# Patient Record
Sex: MALE
[De-identification: names, ages, dates, MRNs, and addresses within clinical notes are randomized per-mention and may not be internally consistent; named-entity substitution may affect disease eponyms.]

---

## 2021-12-29 ENCOUNTER — NURSE TRIAGE (OUTPATIENT)
Dept: OTHER | Facility: CLINIC | Age: 51
End: 2021-12-29

## 2021-12-29 NOTE — TELEPHONE ENCOUNTER
Subjective: Caller states started getting sick on past Sunday,3 days ago,continuing to have vomiting. Current Symptoms: vomited 7 times today,chills/sweating nasal congestion,cough,runny nose,thinks has stomach flu    Onset: 3 days ago    Associated Symptoms: Diarrhea. Denies CP,SOB    Pain Severity: Denies    Temperature: Chills/sweats has not checked temp    What has been tried: Took family members anti nausea med, motrin    LMP: NA Pregnant: NA    Recommended disposition: See PCP within 24 hours. UCC if no appointments available. Care advice provided, patient verbalizes understanding; denies any other questions or concerns; instructed to call back for any new or worsening symptoms. This triage is a result of a call to [address] [place] [address]. Please do not respond to the triage nurse through this encounter. Any subsequent communication should be directly with the patient.     Reason for Disposition   [1] MILD or MODERATE vomiting AND [2] present > 48 hours (2 days) (Exception: mild vomiting with associated diarrhea)    Protocols used: [facility] AND [facility] EVA MEDRANO

## 2023-04-04 LAB
ALANINE AMINOTRANSFERASE (SGPT) (U/L) IN SER/PLAS: 14 U/L (ref 10–52)
ALBUMIN (G/DL) IN SER/PLAS: 4 G/DL (ref 3.4–5)
ALKALINE PHOSPHATASE (U/L) IN SER/PLAS: 115 U/L (ref 33–120)
ANION GAP IN SER/PLAS: 11 MMOL/L (ref 10–20)
ASPARTATE AMINOTRANSFERASE (SGOT) (U/L) IN SER/PLAS: 17 U/L (ref 9–39)
BASOPHILS (10*3/UL) IN BLOOD BY AUTOMATED COUNT: 0.06 X10E9/L (ref 0–0.1)
BASOPHILS/100 LEUKOCYTES IN BLOOD BY AUTOMATED COUNT: 1.1 % (ref 0–2)
BILIRUBIN TOTAL (MG/DL) IN SER/PLAS: 0.6 MG/DL (ref 0–1.2)
CALCIUM (MG/DL) IN SER/PLAS: 9.5 MG/DL (ref 8.6–10.6)
CARBON DIOXIDE, TOTAL (MMOL/L) IN SER/PLAS: 30 MMOL/L (ref 21–32)
CD3+CD4+ ABSOLUTE: 0.45 X10E9/L (ref 0.35–2.74)
CD3+CD8+ ABSOLUTE: 0.67 X10E9/L (ref 0.08–1.49)
CD4/CD8 RATIO: 0.67 (ref 1–3.5)
CD45%: 100 %
CHLORIDE (MMOL/L) IN SER/PLAS: 105 MMOL/L (ref 98–107)
CHOLESTEROL (MG/DL) IN SER/PLAS: 123 MG/DL (ref 0–199)
CHOLESTEROL IN HDL (MG/DL) IN SER/PLAS: 44.1 MG/DL
CHOLESTEROL/HDL RATIO: 2.8
CP CD3+CD4+%: 33 % (ref 29–57)
CP CD3+CD8+%: 49 % (ref 7–31)
CREATININE (MG/DL) IN SER/PLAS: 1.51 MG/DL (ref 0.5–1.3)
EOSINOPHILS (10*3/UL) IN BLOOD BY AUTOMATED COUNT: 0.18 X10E9/L (ref 0–0.7)
EOSINOPHILS/100 LEUKOCYTES IN BLOOD BY AUTOMATED COUNT: 3.4 % (ref 0–6)
ERYTHROCYTE DISTRIBUTION WIDTH (RATIO) BY AUTOMATED COUNT: 22.9 % (ref 11.5–14.5)
ERYTHROCYTE MEAN CORPUSCULAR HEMOGLOBIN CONCENTRATION (G/DL) BY AUTOMATED: 30.4 G/DL (ref 32–36)
ERYTHROCYTE MEAN CORPUSCULAR VOLUME (FL) BY AUTOMATED COUNT: 87 FL (ref 80–100)
ERYTHROCYTES (10*6/UL) IN BLOOD BY AUTOMATED COUNT: 4.15 X10E12/L (ref 4.5–5.9)
ESTIMATED AVERAGE GLUCOSE FOR HBA1C: 100 MG/DL
FMETH: ABNORMAL
FSIT1: ABNORMAL
GFR MALE: 54 ML/MIN/1.73M2
GLUCOSE (MG/DL) IN SER/PLAS: 76 MG/DL (ref 74–99)
HEMATOCRIT (%) IN BLOOD BY AUTOMATED COUNT: 36.2 % (ref 41–52)
HEMOGLOBIN (G/DL) IN BLOOD: 11 G/DL (ref 13.5–17.5)
HEMOGLOBIN A1C/HEMOGLOBIN TOTAL IN BLOOD: 5.1 %
IMMATURE GRANULOCYTES/100 LEUKOCYTES IN BLOOD BY AUTOMATED COUNT: 0.2 % (ref 0–0.9)
LDL: 55 MG/DL (ref 0–99)
LEUKOCYTES (10*3/UL) IN BLOOD BY AUTOMATED COUNT: 5.3 X10E9/L (ref 4.4–11.3)
LYMPHOCYTES (10*3/UL) IN BLOOD BY AUTOMATED COUNT: 1.37 X10E9/L (ref 1.2–4.8)
LYMPHOCYTES/100 LEUKOCYTES IN BLOOD BY AUTOMATED COUNT: 25.7 % (ref 13–44)
MONOCYTES (10*3/UL) IN BLOOD BY AUTOMATED COUNT: 0.41 X10E9/L (ref 0.1–1)
MONOCYTES/100 LEUKOCYTES IN BLOOD BY AUTOMATED COUNT: 7.7 % (ref 2–10)
NEUTROPHILS (10*3/UL) IN BLOOD BY AUTOMATED COUNT: 3.31 X10E9/L (ref 1.2–7.7)
NEUTROPHILS/100 LEUKOCYTES IN BLOOD BY AUTOMATED COUNT: 61.9 % (ref 40–80)
NRBC (PER 100 WBCS) BY AUTOMATED COUNT: 0 /100 WBC (ref 0–0)
PLATELETS (10*3/UL) IN BLOOD AUTOMATED COUNT: 305 X10E9/L (ref 150–450)
POTASSIUM (MMOL/L) IN SER/PLAS: 4.2 MMOL/L (ref 3.5–5.3)
PROTEIN TOTAL: 7.1 G/DL (ref 6.4–8.2)
SODIUM (MMOL/L) IN SER/PLAS: 142 MMOL/L (ref 136–145)
TRIGLYCERIDE (MG/DL) IN SER/PLAS: 120 MG/DL (ref 0–149)
UREA NITROGEN (MG/DL) IN SER/PLAS: 15 MG/DL (ref 6–23)
VLDL: 24 MG/DL (ref 0–40)

## 2023-04-05 LAB
CHLAMYDIA TRACH., AMPLIFIED: NEGATIVE
HIV-1 RNA PCR VIRAL LOAD LOG: NORMAL LOG10 CPY/ML
HIV-1 RNA VIRAL LOAD: NOT DETECTED COPIES/ML
N. GONORRHEA, AMPLIFIED: NEGATIVE
RPR MONITORING: NORMAL
VDRL: NONREACTIVE

## 2023-08-08 ENCOUNTER — HOSPITAL ENCOUNTER (OUTPATIENT)
Dept: DATA CONVERSION | Facility: HOSPITAL | Age: 53
Discharge: HOME | End: 2023-08-08

## 2023-08-08 DIAGNOSIS — N17.9 ACUTE KIDNEY FAILURE, UNSPECIFIED (CMS-HCC): ICD-10-CM

## 2023-08-08 LAB
ALBUMIN SERPL-MCNC: 4 GM/DL (ref 3.5–5)
ANION GAP SERPL CALCULATED.3IONS-SCNC: 9 MMOL/L (ref 0–19)
BACTERIA UR QL AUTO: NEGATIVE
BILIRUB UR QL STRIP.AUTO: NEGATIVE
BUN SERPL-MCNC: 13 MG/DL (ref 8–25)
BUN/CREAT SERPL: 8.7 RATIO (ref 8–21)
CALCIUM SERPL-MCNC: 9.3 MG/DL (ref 8.5–10.4)
CHLORIDE SERPL-SCNC: 106 MMOL/L (ref 97–107)
CLARITY UR: CLEAR
CO2 SERPL-SCNC: 28 MMOL/L (ref 24–31)
COLOR UR: NORMAL
CREAT SERPL-MCNC: 1.5 MG/DL (ref 0.4–1.6)
GFR SERPL CREATININE-BSD FRML MDRD: 55 ML/MIN/1.73 M2
GLUCOSE SERPL-MCNC: 101 MG/DL (ref 65–99)
GLUCOSE UR STRIP.AUTO-MCNC: NEGATIVE MG/DL
HGB UR QL STRIP.AUTO: NORMAL /HPF (ref 0–3)
HGB UR QL: NEGATIVE
HYALINE CASTS UR QL AUTO: NORMAL /LPF
KETONES UR QL STRIP.AUTO: NEGATIVE
LEUKOCYTE ESTERASE UR QL STRIP.AUTO: NEGATIVE
MICROSCOPIC (UA): NORMAL
NITRITE UR QL STRIP.AUTO: NEGATIVE
PH UR STRIP.AUTO: 5.5 [PH] (ref 4.6–8)
PHOSPHATE SERPL-MCNC: 3.1 MG/DL (ref 2.5–4.5)
POTASSIUM SERPL-SCNC: 5.1 MMOL/L (ref 3.4–5.1)
PROT UR STRIP.AUTO-MCNC: NEGATIVE MG/DL
SODIUM SERPL-SCNC: 143 MMOL/L (ref 133–145)
SP GR UR STRIP.AUTO: 1.01 (ref 1–1.03)
SQUAMOUS UR QL AUTO: NORMAL /HPF
URINE CULTURE: NORMAL
UROBILINOGEN UR QL STRIP.AUTO: NORMAL MG/DL (ref 0–1)
WBC #/AREA URNS AUTO: 1 /HPF (ref 0–3)

## 2023-08-09 ENCOUNTER — HOSPITAL ENCOUNTER (OUTPATIENT)
Dept: DATA CONVERSION | Facility: HOSPITAL | Age: 53
Discharge: HOME | End: 2023-08-09

## 2023-08-09 DIAGNOSIS — N17.9 ACUTE KIDNEY FAILURE, UNSPECIFIED (CMS-HCC): ICD-10-CM

## 2023-08-22 PROBLEM — J96.00 ACUTE RESPIRATORY FAILURE (MULTI): Status: ACTIVE | Noted: 2023-08-22

## 2023-08-22 PROBLEM — E87.6 HYPOKALEMIA: Status: ACTIVE | Noted: 2023-08-22

## 2023-08-22 PROBLEM — K80.20 CHOLELITHIASIS: Status: ACTIVE | Noted: 2023-08-22

## 2023-08-22 PROBLEM — E78.5 HYPERLIPIDEMIA: Status: ACTIVE | Noted: 2023-08-22

## 2023-08-22 PROBLEM — J11.2 INFLUENZA WITH GASTROINTESTINAL TRACT INVOLVEMENT: Status: ACTIVE | Noted: 2023-08-22

## 2023-08-22 PROBLEM — Z79.899 HIGH RISK MEDICATION USE: Status: ACTIVE | Noted: 2023-08-22

## 2023-08-22 PROBLEM — R68.89 ABNORMAL FINDING: Status: ACTIVE | Noted: 2023-08-22

## 2023-08-22 PROBLEM — A41.9 SEPSIS (MULTI): Status: ACTIVE | Noted: 2023-08-22

## 2023-08-22 PROBLEM — E86.0 DEHYDRATION: Status: ACTIVE | Noted: 2023-08-22

## 2023-08-22 PROBLEM — R53.1 ASTHENIA: Status: ACTIVE | Noted: 2023-08-22

## 2023-08-22 PROBLEM — Z21 HUMAN IMMUNODEFICIENCY VIRUS INFECTION: Status: ACTIVE | Noted: 2023-08-22

## 2023-08-22 PROBLEM — Z59.41 FOOD INSECURITY: Status: ACTIVE | Noted: 2023-08-22

## 2023-08-22 PROBLEM — Z91.89 GLAUCOMA HIGH RISK: Status: ACTIVE | Noted: 2023-08-22

## 2023-08-22 PROBLEM — E87.20 ACIDOSIS: Status: ACTIVE | Noted: 2023-08-22

## 2023-08-22 PROBLEM — B20 HUMAN IMMUNODEFICIENCY VIRUS INFECTION (MULTI): Status: ACTIVE | Noted: 2023-08-22

## 2023-08-22 PROBLEM — I10 HYPERTENSION: Status: ACTIVE | Noted: 2023-08-22

## 2023-08-22 PROBLEM — N19 RENAL FAILURE: Status: ACTIVE | Noted: 2023-08-22

## 2023-08-22 PROBLEM — F41.8 DEPRESSION WITH ANXIETY: Status: ACTIVE | Noted: 2023-08-22

## 2023-08-22 PROBLEM — R18.8 ABDOMINAL FLUID COLLECTION: Status: ACTIVE | Noted: 2023-08-22

## 2023-08-22 PROBLEM — E11.9 DIABETES MELLITUS WITHOUT COMPLICATION (MULTI): Status: ACTIVE | Noted: 2023-08-22

## 2023-08-22 PROBLEM — E43 SEVERE PROTEIN-CALORIE MALNUTRITION (GOMEZ: LESS THAN 60% OF STANDARD WEIGHT) (MULTI): Status: ACTIVE | Noted: 2023-08-22

## 2023-08-22 PROBLEM — R11.2 NAUSEA & VOMITING: Status: ACTIVE | Noted: 2023-08-22

## 2023-08-22 PROBLEM — Z93.2 ILEOSTOMY STATUS (MULTI): Status: ACTIVE | Noted: 2023-08-22

## 2023-08-22 PROBLEM — D64.9 HEMOGLOBIN LOW: Status: ACTIVE | Noted: 2023-08-22

## 2023-08-22 PROBLEM — E86.1 HYPOVOLEMIA: Status: ACTIVE | Noted: 2023-08-22

## 2023-08-22 PROBLEM — F41.9 ANXIETY: Status: ACTIVE | Noted: 2023-08-22

## 2023-08-22 PROBLEM — K08.89 ATYPICAL TOOTHACHE: Status: ACTIVE | Noted: 2023-08-22

## 2023-08-22 PROBLEM — Z93.2 ILEOSTOMY IN PLACE (MULTI): Status: ACTIVE | Noted: 2023-08-22

## 2023-08-22 PROBLEM — E87.5 HYPERKALEMIA: Status: ACTIVE | Noted: 2023-08-22

## 2023-08-22 PROBLEM — I51.89 IMPAIRED CARDIAC FUNCTION: Status: ACTIVE | Noted: 2023-08-22

## 2023-08-22 PROBLEM — E43 SEVERE MALNUTRITION (MULTI): Status: ACTIVE | Noted: 2023-08-22

## 2023-08-22 PROBLEM — B20 AIDS (MULTI): Status: ACTIVE | Noted: 2023-08-22

## 2023-08-22 PROBLEM — E87.1 HYPONATREMIA: Status: ACTIVE | Noted: 2023-08-22

## 2023-08-22 PROBLEM — R79.89 ABNORMAL LIVER FUNCTION TESTS: Status: ACTIVE | Noted: 2023-08-22

## 2023-08-22 PROBLEM — G47.30 SLEEP APNEA: Status: ACTIVE | Noted: 2023-08-22

## 2023-08-22 PROBLEM — K65.9 PERITONITIS (MULTI): Status: ACTIVE | Noted: 2023-08-22

## 2023-08-22 PROBLEM — F32.9 REACTIVE DEPRESSION: Status: ACTIVE | Noted: 2023-08-22

## 2023-08-22 RX ORDER — LISINOPRIL 10 MG/1
1 TABLET ORAL DAILY
COMMUNITY
Start: 2021-02-10 | End: 2024-03-22 | Stop reason: ENTERED-IN-ERROR

## 2023-08-22 RX ORDER — LATANOPROST 50 UG/ML
1 SOLUTION/ DROPS OPHTHALMIC NIGHTLY
COMMUNITY

## 2023-08-22 RX ORDER — CHOLESTYRAMINE 4 G/9G
1 POWDER, FOR SUSPENSION ORAL DAILY
COMMUNITY
End: 2024-03-22 | Stop reason: ENTERED-IN-ERROR

## 2023-08-22 RX ORDER — IBUPROFEN 800 MG/1
800 TABLET ORAL EVERY 8 HOURS PRN
COMMUNITY
End: 2024-03-22 | Stop reason: ENTERED-IN-ERROR

## 2023-08-22 RX ORDER — ESCITALOPRAM OXALATE 10 MG/1
1 TABLET ORAL DAILY
COMMUNITY
Start: 2021-10-05 | End: 2024-03-22 | Stop reason: ENTERED-IN-ERROR

## 2023-08-22 RX ORDER — PANTOPRAZOLE SODIUM 40 MG/1
40 TABLET, DELAYED RELEASE ORAL DAILY
COMMUNITY
Start: 2022-07-13 | End: 2024-03-22 | Stop reason: ENTERED-IN-ERROR

## 2023-08-22 RX ORDER — DIPHENOXYLATE HYDROCHLORIDE AND ATROPINE SULFATE 2.5; .025 MG/1; MG/1
1 TABLET ORAL 4 TIMES DAILY PRN
COMMUNITY
End: 2024-03-22 | Stop reason: ENTERED-IN-ERROR

## 2023-08-22 RX ORDER — CODEINE SULFATE 15 MG/1
15 TABLET ORAL 4 TIMES DAILY PRN
COMMUNITY
End: 2024-03-22 | Stop reason: ENTERED-IN-ERROR

## 2023-08-22 RX ORDER — MULTIVITAMIN
1 TABLET ORAL DAILY
COMMUNITY

## 2023-08-22 RX ORDER — ELVITEGRAVIR, COBICISTAT, EMTRICITABINE, AND TENOFOVIR ALAFENAMIDE 150; 150; 200; 10 MG/1; MG/1; MG/1; MG/1
TABLET ORAL
COMMUNITY
Start: 2017-05-30 | End: 2024-04-05

## 2023-08-22 RX ORDER — DOCUSATE SODIUM 100 MG/1
100 CAPSULE, LIQUID FILLED ORAL DAILY PRN
COMMUNITY
End: 2024-03-22 | Stop reason: ENTERED-IN-ERROR

## 2023-08-22 RX ORDER — LOPERAMIDE HYDROCHLORIDE 2 MG/1
2 CAPSULE ORAL 4 TIMES DAILY PRN
COMMUNITY

## 2023-10-03 ENCOUNTER — OFFICE VISIT (OUTPATIENT)
Dept: IMMUNOLOGY | Facility: CLINIC | Age: 53
End: 2023-10-03
Payer: MEDICAID

## 2023-10-03 VITALS
HEART RATE: 68 BPM | SYSTOLIC BLOOD PRESSURE: 155 MMHG | OXYGEN SATURATION: 99 % | HEIGHT: 72 IN | RESPIRATION RATE: 16 BRPM | DIASTOLIC BLOOD PRESSURE: 87 MMHG | TEMPERATURE: 97.8 F | WEIGHT: 162.8 LBS | BODY MASS INDEX: 22.05 KG/M2

## 2023-10-03 DIAGNOSIS — Z23 FLU VACCINE NEED: ICD-10-CM

## 2023-10-03 DIAGNOSIS — Z21 ASYMPTOMATIC HIV INFECTION, WITH NO HISTORY OF HIV-RELATED ILLNESS (MULTI): Primary | ICD-10-CM

## 2023-10-03 DIAGNOSIS — E11.9 DIABETES MELLITUS WITHOUT COMPLICATION (MULTI): ICD-10-CM

## 2023-10-03 DIAGNOSIS — B20 AIDS (MULTI): ICD-10-CM

## 2023-10-03 LAB
ALBUMIN SERPL BCP-MCNC: 4.1 G/DL (ref 3.4–5)
ALP SERPL-CCNC: 102 U/L (ref 33–120)
ALT SERPL W P-5'-P-CCNC: 15 U/L (ref 10–52)
ANION GAP SERPL CALC-SCNC: 13 MMOL/L (ref 10–20)
AST SERPL W P-5'-P-CCNC: 20 U/L (ref 9–39)
BASOPHILS # BLD AUTO: 0.06 X10*3/UL (ref 0–0.1)
BASOPHILS NFR BLD AUTO: 0.9 %
BILIRUB SERPL-MCNC: 0.5 MG/DL (ref 0–1.2)
BUN SERPL-MCNC: 18 MG/DL (ref 6–23)
CALCIUM SERPL-MCNC: 9.7 MG/DL (ref 8.6–10.6)
CHLORIDE SERPL-SCNC: 106 MMOL/L (ref 98–107)
CO2 SERPL-SCNC: 29 MMOL/L (ref 21–32)
CREAT SERPL-MCNC: 1.28 MG/DL (ref 0.5–1.3)
EOSINOPHIL # BLD AUTO: 0.37 X10*3/UL (ref 0–0.7)
EOSINOPHIL NFR BLD AUTO: 5.5 %
ERYTHROCYTE [DISTWIDTH] IN BLOOD BY AUTOMATED COUNT: 16.6 % (ref 11.5–14.5)
GFR SERPL CREATININE-BSD FRML MDRD: 66 ML/MIN/1.73M*2
GLUCOSE SERPL-MCNC: 61 MG/DL (ref 74–99)
HCT VFR BLD AUTO: 43.9 % (ref 41–52)
HGB BLD-MCNC: 13.7 G/DL (ref 13.5–17.5)
IMM GRANULOCYTES # BLD AUTO: 0.01 X10*3/UL (ref 0–0.7)
IMM GRANULOCYTES NFR BLD AUTO: 0.1 % (ref 0–0.9)
LYMPHOCYTES # BLD AUTO: 2.14 X10*3/UL (ref 1.2–4.8)
LYMPHOCYTES NFR BLD AUTO: 32 %
MCH RBC QN AUTO: 31.4 PG (ref 26–34)
MCHC RBC AUTO-ENTMCNC: 31.2 G/DL (ref 32–36)
MCV RBC AUTO: 101 FL (ref 80–100)
MONOCYTES # BLD AUTO: 0.49 X10*3/UL (ref 0.1–1)
MONOCYTES NFR BLD AUTO: 7.3 %
NEUTROPHILS # BLD AUTO: 3.61 X10*3/UL (ref 1.2–7.7)
NEUTROPHILS NFR BLD AUTO: 54.2 %
NRBC BLD-RTO: 0 /100 WBCS (ref 0–0)
PLATELET # BLD AUTO: 236 X10*3/UL (ref 150–450)
PMV BLD AUTO: 11.7 FL (ref 7.5–11.5)
POTASSIUM SERPL-SCNC: 4.7 MMOL/L (ref 3.5–5.3)
PROT SERPL-MCNC: 7.2 G/DL (ref 6.4–8.2)
RBC # BLD AUTO: 4.36 X10*6/UL (ref 4.5–5.9)
SODIUM SERPL-SCNC: 143 MMOL/L (ref 136–145)
WBC # BLD AUTO: 6.7 X10*3/UL (ref 4.4–11.3)

## 2023-10-03 PROCEDURE — 90686 IIV4 VACC NO PRSV 0.5 ML IM: CPT | Performed by: NURSE PRACTITIONER

## 2023-10-03 PROCEDURE — 3079F DIAST BP 80-89 MM HG: CPT | Performed by: NURSE PRACTITIONER

## 2023-10-03 PROCEDURE — 4010F ACE/ARB THERAPY RXD/TAKEN: CPT | Performed by: NURSE PRACTITIONER

## 2023-10-03 PROCEDURE — 88184 FLOWCYTOMETRY/ TC 1 MARKER: CPT | Mod: TC | Performed by: NURSE PRACTITIONER

## 2023-10-03 PROCEDURE — 4274F FLU IMMUNO ADMIND RCVD: CPT | Performed by: NURSE PRACTITIONER

## 2023-10-03 PROCEDURE — 3044F HG A1C LEVEL LT 7.0%: CPT | Performed by: NURSE PRACTITIONER

## 2023-10-03 PROCEDURE — 99213 OFFICE O/P EST LOW 20 MIN: CPT | Mod: 25 | Performed by: NURSE PRACTITIONER

## 2023-10-03 PROCEDURE — 85025 COMPLETE CBC W/AUTO DIFF WBC: CPT | Performed by: NURSE PRACTITIONER

## 2023-10-03 PROCEDURE — 87536 HIV-1 QUANT&REVRSE TRNSCRPJ: CPT | Performed by: NURSE PRACTITIONER

## 2023-10-03 PROCEDURE — 1036F TOBACCO NON-USER: CPT | Performed by: NURSE PRACTITIONER

## 2023-10-03 PROCEDURE — 3077F SYST BP >= 140 MM HG: CPT | Performed by: NURSE PRACTITIONER

## 2023-10-03 PROCEDURE — 36415 COLL VENOUS BLD VENIPUNCTURE: CPT | Performed by: NURSE PRACTITIONER

## 2023-10-03 PROCEDURE — 99213 OFFICE O/P EST LOW 20 MIN: CPT | Performed by: NURSE PRACTITIONER

## 2023-10-03 PROCEDURE — 82435 ASSAY OF BLOOD CHLORIDE: CPT | Performed by: NURSE PRACTITIONER

## 2023-10-03 RX ORDER — TIMOLOL MALEATE 5 MG/ML
1 SOLUTION/ DROPS OPHTHALMIC DAILY
COMMUNITY

## 2023-10-03 ASSESSMENT — ENCOUNTER SYMPTOMS
RESPIRATORY NEGATIVE: 1
CONSTITUTIONAL NEGATIVE: 1
ARTHRALGIAS: 1
HEMATOLOGIC/LYMPHATIC NEGATIVE: 1
ENDOCRINE NEGATIVE: 1
CARDIOVASCULAR NEGATIVE: 1
HEADACHES: 1

## 2023-10-03 ASSESSMENT — PAIN SCALES - GENERAL: PAINLEVEL: 0-NO PAIN

## 2023-10-03 NOTE — PROGRESS NOTES
HIV Clinic Follow-up Visit:    Jet Pearl was last seen in [facility] on Visit date not found.   He is starting full time work at the [employer] as of today.   He remains active at work; and feels pretty good about the full time work.    Missed antiretroviral doses in last 72 hours? No  Sexually active? yes, Partner/s aware of diagnosis? yes,   Condom use?  Monogamous relationship , Partner on PrEP? No  partner is HIV+      Review of Systems  Review of Systems   Constitutional: Negative.    HENT: Negative.     Respiratory: Negative.     Cardiovascular: Negative.    Endocrine: Negative.    Genitourinary: Negative.    Musculoskeletal:  Positive for arthralgias.   Neurological:  Positive for headaches.   Hematological: Negative.    Eyes:   He has been seen by ophth this year for his glaucoma.  He has been placed on a second eye drop.    GI:   His bowel movements are mostly soft - not diarrhea.   He is now able to eat pretty much whatever he wants.    He has no unusual aches or pains.   He has no unusual anxiety or depression.     CURRENT MEDICATIONS:    Current Outpatient Medications:     elviteg-cob-emtri-tenof ALAFEN (Genvoya) [phone] mg tablet, Take by mouth. As directed., Disp: , Rfl:     latanoprost (Xalatan) 0.005 % ophthalmic solution, Administer 1 drop into both eyes once daily at bedtime., Disp: , Rfl:     loperamide (Imodium) 2 mg capsule, Take 1 capsule (2 mg) by mouth 4 times a day as needed., Disp: , Rfl:     multivitamin tablet, Take 1 tablet by mouth once daily., Disp: , Rfl:     timolol (Timoptic) 0.5 % ophthalmic solution, Administer 1 drop into both eyes once daily., Disp: , Rfl:     cholestyramine (Questran) 4 gram packet, Take 1 packet (4 g) by mouth once daily. for 30 day(s), Disp: , Rfl:     codeine 15 mg tablet, Take 1 tablet (15 mg) by mouth 4 times a day as needed., Disp: , Rfl:     diphenoxylate-atropine (Lomotil) 2.5-0.025 mg tablet, Take 1 tablet by mouth 4 times a day as needed., Disp: ,  Rfl:     docusate sodium (Colace) 100 mg capsule, Take 1 capsule (100 mg) by mouth once daily as needed. for 30 day(s), Disp: , Rfl:     escitalopram (Lexapro) 10 mg tablet, Take 1 tablet (10 mg) by mouth once daily., Disp: , Rfl:     ibuprofen 800 mg tablet, Take 1 tablet (800 mg) by mouth every 8 hours if needed. with food or milk, Disp: , Rfl:     lisinopril 10 mg tablet, Take 1 tablet (10 mg) by mouth once daily., Disp: , Rfl:     pantoprazole (ProtoNix) 40 mg EC tablet, Take 1 tablet (40 mg) by mouth once daily. for 90 day(s), Disp: , Rfl:     PHYSICAL EXAMINATION:  Visit Vitals  /87 (BP Location: Right arm, Patient Position: Sitting, BP Cuff Size: Adult)   Pulse 68   Temp 36.6 °C (97.8 °F) (Temporal)   Resp 16   Ht 1.829 m (6')   Wt 73.8 kg (162 lb 12.8 oz)   SpO2 99%   BMI 22.08 kg/m²   Smoking Status Never   BSA 1.94 m²       Physical Exam   Physical Exam  Constitutional:       Appearance: Normal appearance.   HENT:      Head: Normocephalic.   Cardiovascular:      Rate and Rhythm: Normal rate and regular rhythm.      Pulses: Normal pulses.   Pulmonary:      Effort: Pulmonary effort is normal.      Breath sounds: Normal breath sounds.   Abdominal:      General: Bowel sounds are normal.      Comments: Scar from previous surgery is evident on R side of abdomen at about the level of the umbilicus.   Musculoskeletal:         General: Normal range of motion.      Cervical back: Neck supple.   Skin:     General: Skin is warm and dry.   Neurological:      Mental Status: He is alert and oriented to person, place, and time.   Psychiatric:         Mood and Affect: Mood normal.      Comments: Denies any unusual anxiety or depression.    He has been in therapy.  He and his partner also tried some couples counseling.          PERTINENT DATA:  CrCl cannot be calculated (Patient's most recent lab result is older than the maximum 7 days allowed.).  The ASCVD Risk score (Loan PEREIRA, et al., 2019) failed to calculate for  the following reasons:    The valid total cholesterol range is 130 to 320 mg/dL    ASSESSMENT / PLAN:  Problem List Items Addressed This Visit       AIDS (CMS/HCC)    Diabetes mellitus without complication (CMS/HCC)    Human immunodeficiency virus infection (CMS/HCC) - Primary    Relevant Orders    CBC and Auto Differential    CD4/8 Panel    Comprehensive Metabolic Panel    HIV RNA, quantitative, PCR     Other Visit Diagnoses       Flu vaccine need        Relevant Orders    Flu vaccine (IIV4) age 6 months and greater, preservative free (Completed)         Continue current regimen.   See again in 6 months.     Alma Dinero, APRN-CNP

## 2023-10-03 NOTE — LETTER
10/03/23    Randy Pedersen MD  [address] [facility] [address]      Dear Dr. Randy Pedersen MD,    I am writing to confirm that your patient, Jet Pearl, received care in my office on 10/03/23. I have enclosed a summary of the care provided to Jet for your reference.    Please contact me with any questions you may have regarding the visit.    Sincerely,         Alma Dinero, APRN-CNP  [address] [address]    CC: No Recipients

## 2023-10-04 LAB
CD3+CD4+ CELLS # BLD: 0.6 X10E9/L
CD3+CD4+ CELLS NFR BLD: 28 %
CD3+CD4+ CELLS/CD3+CD8+ CLL BLD: 0.54 %
CD3+CD8+ CELLS # BLD: 1.11 X10E9/L
CD3+CD8+ CELLS NFR BLD: 52 %
LYMPHOCYTES # SPEC AUTO: 2.14 X10*3/UL

## 2023-10-20 LAB
HIV1 RNA # PLAS NAA DL=20: NOT DETECTED {COPIES}/ML
HIV1 RNA SPEC NAA+PROBE-LOG#: NORMAL {LOG_COPIES}/ML

## 2024-02-12 ENCOUNTER — LAB (OUTPATIENT)
Dept: LAB | Facility: LAB | Age: 54
End: 2024-02-12
Payer: MEDICAID

## 2024-02-12 DIAGNOSIS — N18.30 CHRONIC KIDNEY DISEASE, STAGE 3 UNSPECIFIED (MULTI): Primary | ICD-10-CM

## 2024-02-12 LAB
ALBUMIN SERPL BCP-MCNC: 3.8 G/DL (ref 3.4–5)
ANION GAP SERPL CALC-SCNC: 9 MMOL/L (ref 10–20)
BUN SERPL-MCNC: 13 MG/DL (ref 6–23)
CALCIUM SERPL-MCNC: 8.9 MG/DL (ref 8.6–10.3)
CHLORIDE SERPL-SCNC: 107 MMOL/L (ref 98–107)
CO2 SERPL-SCNC: 30 MMOL/L (ref 21–32)
CREAT SERPL-MCNC: 1.4 MG/DL (ref 0.5–1.3)
EGFRCR SERPLBLD CKD-EPI 2021: 59 ML/MIN/1.73M*2
ERYTHROCYTE [DISTWIDTH] IN BLOOD BY AUTOMATED COUNT: 15.1 % (ref 11.5–14.5)
GLUCOSE SERPL-MCNC: 88 MG/DL (ref 74–99)
HCT VFR BLD AUTO: 41.7 % (ref 41–52)
HGB BLD-MCNC: 13.1 G/DL (ref 13.5–17.5)
MCH RBC QN AUTO: 32.9 PG (ref 26–34)
MCHC RBC AUTO-ENTMCNC: 31.4 G/DL (ref 32–36)
MCV RBC AUTO: 105 FL (ref 80–100)
NRBC BLD-RTO: 0 /100 WBCS (ref 0–0)
PHOSPHATE SERPL-MCNC: 2.6 MG/DL (ref 2.5–4.9)
PLATELET # BLD AUTO: 238 X10*3/UL (ref 150–450)
POTASSIUM SERPL-SCNC: 3.8 MMOL/L (ref 3.5–5.3)
RBC # BLD AUTO: 3.98 X10*6/UL (ref 4.5–5.9)
SODIUM SERPL-SCNC: 142 MMOL/L (ref 136–145)
WBC # BLD AUTO: 6.4 X10*3/UL (ref 4.4–11.3)

## 2024-02-12 PROCEDURE — 83970 ASSAY OF PARATHORMONE: CPT

## 2024-02-12 PROCEDURE — 36415 COLL VENOUS BLD VENIPUNCTURE: CPT

## 2024-02-12 PROCEDURE — 85027 COMPLETE CBC AUTOMATED: CPT

## 2024-02-12 PROCEDURE — 80069 RENAL FUNCTION PANEL: CPT

## 2024-02-13 LAB — PTH-INTACT SERPL-MCNC: 30.2 PG/ML (ref 18.5–88)

## 2024-03-22 ENCOUNTER — HOSPITAL ENCOUNTER (INPATIENT)
Facility: HOSPITAL | Age: 54
LOS: 2 days | Discharge: HOME | End: 2024-03-24
Attending: EMERGENCY MEDICINE | Admitting: INTERNAL MEDICINE
Payer: MEDICAID

## 2024-03-22 ENCOUNTER — APPOINTMENT (OUTPATIENT)
Dept: RADIOLOGY | Facility: HOSPITAL | Age: 54
End: 2024-03-22
Payer: MEDICAID

## 2024-03-22 DIAGNOSIS — N17.9 ACUTE KIDNEY INJURY (CMS-HCC): ICD-10-CM

## 2024-03-22 DIAGNOSIS — N30.00 ACUTE CYSTITIS WITHOUT HEMATURIA: ICD-10-CM

## 2024-03-22 DIAGNOSIS — N20.1 LEFT URETERAL STONE: ICD-10-CM

## 2024-03-22 DIAGNOSIS — N20.0 LEFT NEPHROLITHIASIS: Primary | ICD-10-CM

## 2024-03-22 DIAGNOSIS — R11.2 NAUSEA AND VOMITING, UNSPECIFIED VOMITING TYPE: ICD-10-CM

## 2024-03-22 LAB
ALBUMIN SERPL-MCNC: 4.4 G/DL (ref 3.5–5)
ALP BLD-CCNC: 128 U/L (ref 35–125)
ALT SERPL-CCNC: 17 U/L (ref 5–40)
ANION GAP SERPL CALC-SCNC: 16 MMOL/L
APPEARANCE UR: ABNORMAL
AST SERPL-CCNC: 23 U/L (ref 5–40)
BASOPHILS # BLD AUTO: 0.02 X10*3/UL (ref 0–0.1)
BASOPHILS NFR BLD AUTO: 0.2 %
BILIRUB SERPL-MCNC: 0.8 MG/DL (ref 0.1–1.2)
BILIRUB UR STRIP.AUTO-MCNC: NEGATIVE MG/DL
BUN SERPL-MCNC: 18 MG/DL (ref 8–25)
CALCIUM SERPL-MCNC: 9.5 MG/DL (ref 8.5–10.4)
CHLORIDE SERPL-SCNC: 103 MMOL/L (ref 97–107)
CO2 SERPL-SCNC: 22 MMOL/L (ref 24–31)
COLOR UR: YELLOW
CREAT SERPL-MCNC: 2 MG/DL (ref 0.4–1.6)
CREAT UR-MCNC: 172.9 MG/DL
EGFRCR SERPLBLD CKD-EPI 2021: 39 ML/MIN/1.73M*2
EOSINOPHIL # BLD AUTO: 0 X10*3/UL (ref 0–0.7)
EOSINOPHIL NFR BLD AUTO: 0 %
ERYTHROCYTE [DISTWIDTH] IN BLOOD BY AUTOMATED COUNT: 14.7 % (ref 11.5–14.5)
FLUAV RNA RESP QL NAA+PROBE: NOT DETECTED
FLUBV RNA RESP QL NAA+PROBE: NOT DETECTED
GLUCOSE SERPL-MCNC: 140 MG/DL (ref 65–99)
GLUCOSE UR STRIP.AUTO-MCNC: ABNORMAL MG/DL
HCT VFR BLD AUTO: 43.1 % (ref 41–52)
HGB BLD-MCNC: 14.4 G/DL (ref 13.5–17.5)
IMM GRANULOCYTES # BLD AUTO: 0.05 X10*3/UL (ref 0–0.7)
IMM GRANULOCYTES NFR BLD AUTO: 0.4 % (ref 0–0.9)
KETONES UR STRIP.AUTO-MCNC: ABNORMAL MG/DL
LEUKOCYTE ESTERASE UR QL STRIP.AUTO: NEGATIVE
LIPASE SERPL-CCNC: 18 U/L (ref 16–63)
LYMPHOCYTES # BLD AUTO: 1.85 X10*3/UL (ref 1.2–4.8)
LYMPHOCYTES NFR BLD AUTO: 14.2 %
MCH RBC QN AUTO: 32.8 PG (ref 26–34)
MCHC RBC AUTO-ENTMCNC: 33.4 G/DL (ref 32–36)
MCV RBC AUTO: 98 FL (ref 80–100)
MONOCYTES # BLD AUTO: 0.68 X10*3/UL (ref 0.1–1)
MONOCYTES NFR BLD AUTO: 5.2 %
MUCOUS THREADS #/AREA URNS AUTO: ABNORMAL /LPF
NEUTROPHILS # BLD AUTO: 10.45 X10*3/UL (ref 1.2–7.7)
NEUTROPHILS NFR BLD AUTO: 80 %
NITRITE UR QL STRIP.AUTO: NEGATIVE
NRBC BLD-RTO: 0 /100 WBCS (ref 0–0)
PH UR STRIP.AUTO: 6 [PH]
PLATELET # BLD AUTO: 238 X10*3/UL (ref 150–450)
POTASSIUM SERPL-SCNC: 3.5 MMOL/L (ref 3.4–5.1)
PROT SERPL-MCNC: 8.3 G/DL (ref 5.9–7.9)
PROT UR STRIP.AUTO-MCNC: ABNORMAL MG/DL
RBC # BLD AUTO: 4.39 X10*6/UL (ref 4.5–5.9)
RBC # UR STRIP.AUTO: ABNORMAL /UL
RBC #/AREA URNS AUTO: >20 /HPF
RBC MORPH BLD: NORMAL
SARS-COV-2 RNA RESP QL NAA+PROBE: NOT DETECTED
SODIUM SERPL-SCNC: 141 MMOL/L (ref 133–145)
SODIUM UR-SCNC: 158 MMOL/L
SODIUM/CREAT UR-RTO: 91 MMOL/G CREAT
SP GR UR STRIP.AUTO: 1.02
UROBILINOGEN UR STRIP.AUTO-MCNC: NORMAL MG/DL
WBC # BLD AUTO: 13.1 X10*3/UL (ref 4.4–11.3)
WBC #/AREA URNS AUTO: ABNORMAL /HPF

## 2024-03-22 PROCEDURE — 74176 CT ABD & PELVIS W/O CONTRAST: CPT

## 2024-03-22 PROCEDURE — 36415 COLL VENOUS BLD VENIPUNCTURE: CPT | Performed by: INTERNAL MEDICINE

## 2024-03-22 PROCEDURE — 96374 THER/PROPH/DIAG INJ IV PUSH: CPT

## 2024-03-22 PROCEDURE — 96361 HYDRATE IV INFUSION ADD-ON: CPT

## 2024-03-22 PROCEDURE — 1210000001 HC SEMI-PRIVATE ROOM DAILY

## 2024-03-22 PROCEDURE — 99285 EMERGENCY DEPT VISIT HI MDM: CPT | Mod: 25

## 2024-03-22 PROCEDURE — 2500000002 HC RX 250 W HCPCS SELF ADMINISTERED DRUGS (ALT 637 FOR MEDICARE OP, ALT 636 FOR OP/ED): Performed by: UROLOGY

## 2024-03-22 PROCEDURE — 2500000004 HC RX 250 GENERAL PHARMACY W/ HCPCS (ALT 636 FOR OP/ED): Performed by: INTERNAL MEDICINE

## 2024-03-22 PROCEDURE — 82570 ASSAY OF URINE CREATININE: CPT | Performed by: EMERGENCY MEDICINE

## 2024-03-22 PROCEDURE — 85025 COMPLETE CBC W/AUTO DIFF WBC: CPT

## 2024-03-22 PROCEDURE — 83690 ASSAY OF LIPASE: CPT

## 2024-03-22 PROCEDURE — 36415 COLL VENOUS BLD VENIPUNCTURE: CPT

## 2024-03-22 PROCEDURE — 2500000004 HC RX 250 GENERAL PHARMACY W/ HCPCS (ALT 636 FOR OP/ED)

## 2024-03-22 PROCEDURE — 80053 COMPREHEN METABOLIC PANEL: CPT

## 2024-03-22 PROCEDURE — 81001 URINALYSIS AUTO W/SCOPE: CPT

## 2024-03-22 PROCEDURE — 87636 SARSCOV2 & INF A&B AMP PRB: CPT

## 2024-03-22 PROCEDURE — 87086 URINE CULTURE/COLONY COUNT: CPT | Mod: WESLAB

## 2024-03-22 PROCEDURE — 74176 CT ABD & PELVIS W/O CONTRAST: CPT | Performed by: RADIOLOGY

## 2024-03-22 PROCEDURE — 96375 TX/PRO/DX INJ NEW DRUG ADDON: CPT

## 2024-03-22 PROCEDURE — 87040 BLOOD CULTURE FOR BACTERIA: CPT | Mod: WESLAB | Performed by: INTERNAL MEDICINE

## 2024-03-22 RX ORDER — ACETAMINOPHEN 160 MG/5ML
650 SOLUTION ORAL EVERY 4 HOURS PRN
Status: DISCONTINUED | OUTPATIENT
Start: 2024-03-22 | End: 2024-03-24 | Stop reason: HOSPADM

## 2024-03-22 RX ORDER — MORPHINE SULFATE 4 MG/ML
4 INJECTION, SOLUTION INTRAMUSCULAR; INTRAVENOUS ONCE
Status: COMPLETED | OUTPATIENT
Start: 2024-03-22 | End: 2024-03-22

## 2024-03-22 RX ORDER — LATANOPROST 50 UG/ML
1 SOLUTION/ DROPS OPHTHALMIC NIGHTLY
Status: DISCONTINUED | OUTPATIENT
Start: 2024-03-22 | End: 2024-03-24 | Stop reason: HOSPADM

## 2024-03-22 RX ORDER — ONDANSETRON HYDROCHLORIDE 2 MG/ML
4 INJECTION, SOLUTION INTRAVENOUS ONCE
Status: COMPLETED | OUTPATIENT
Start: 2024-03-22 | End: 2024-03-22

## 2024-03-22 RX ORDER — SODIUM CHLORIDE 9 MG/ML
125 INJECTION, SOLUTION INTRAVENOUS CONTINUOUS
Status: DISCONTINUED | OUTPATIENT
Start: 2024-03-22 | End: 2024-03-24 | Stop reason: HOSPADM

## 2024-03-22 RX ORDER — MORPHINE SULFATE 2 MG/ML
2 INJECTION, SOLUTION INTRAMUSCULAR; INTRAVENOUS
Status: DISCONTINUED | OUTPATIENT
Start: 2024-03-22 | End: 2024-03-24 | Stop reason: HOSPADM

## 2024-03-22 RX ORDER — ACETAMINOPHEN 325 MG/1
650 TABLET ORAL EVERY 4 HOURS PRN
Status: DISCONTINUED | OUTPATIENT
Start: 2024-03-22 | End: 2024-03-24 | Stop reason: HOSPADM

## 2024-03-22 RX ORDER — TAMSULOSIN HYDROCHLORIDE 0.4 MG/1
0.4 CAPSULE ORAL DAILY
Status: DISCONTINUED | OUTPATIENT
Start: 2024-03-22 | End: 2024-03-24 | Stop reason: HOSPADM

## 2024-03-22 RX ORDER — MORPHINE SULFATE 4 MG/ML
4 INJECTION, SOLUTION INTRAMUSCULAR; INTRAVENOUS EVERY 4 HOURS PRN
Status: DISCONTINUED | OUTPATIENT
Start: 2024-03-22 | End: 2024-03-24 | Stop reason: HOSPADM

## 2024-03-22 RX ORDER — TIMOLOL MALEATE 5 MG/ML
1 SOLUTION/ DROPS OPHTHALMIC DAILY
Status: DISCONTINUED | OUTPATIENT
Start: 2024-03-22 | End: 2024-03-24 | Stop reason: HOSPADM

## 2024-03-22 RX ORDER — BISMUTH SUBSALICYLATE 262 MG
1 TABLET,CHEWABLE ORAL DAILY
Status: DISCONTINUED | OUTPATIENT
Start: 2024-03-22 | End: 2024-03-24 | Stop reason: HOSPADM

## 2024-03-22 RX ORDER — PROCHLORPERAZINE EDISYLATE 5 MG/ML
10 INJECTION INTRAMUSCULAR; INTRAVENOUS ONCE
Status: COMPLETED | OUTPATIENT
Start: 2024-03-22 | End: 2024-03-22

## 2024-03-22 RX ORDER — POLYETHYLENE GLYCOL 3350 17 G/17G
17 POWDER, FOR SOLUTION ORAL DAILY PRN
Status: DISCONTINUED | OUTPATIENT
Start: 2024-03-22 | End: 2024-03-24 | Stop reason: HOSPADM

## 2024-03-22 RX ORDER — ACETAMINOPHEN 500 MG
5 TABLET ORAL NIGHTLY PRN
Status: DISCONTINUED | OUTPATIENT
Start: 2024-03-22 | End: 2024-03-24 | Stop reason: HOSPADM

## 2024-03-22 RX ORDER — ONDANSETRON 4 MG/1
4 TABLET, FILM COATED ORAL EVERY 8 HOURS PRN
Status: DISCONTINUED | OUTPATIENT
Start: 2024-03-22 | End: 2024-03-24 | Stop reason: HOSPADM

## 2024-03-22 RX ORDER — ONDANSETRON HYDROCHLORIDE 2 MG/ML
4 INJECTION, SOLUTION INTRAVENOUS EVERY 8 HOURS PRN
Status: DISCONTINUED | OUTPATIENT
Start: 2024-03-22 | End: 2024-03-24 | Stop reason: HOSPADM

## 2024-03-22 RX ORDER — CEFTRIAXONE 1 G/50ML
1 INJECTION, SOLUTION INTRAVENOUS EVERY 24 HOURS
Status: DISCONTINUED | OUTPATIENT
Start: 2024-03-22 | End: 2024-03-24 | Stop reason: HOSPADM

## 2024-03-22 RX ORDER — HEPARIN SODIUM 5000 [USP'U]/ML
5000 INJECTION, SOLUTION INTRAVENOUS; SUBCUTANEOUS EVERY 8 HOURS SCHEDULED
Status: DISCONTINUED | OUTPATIENT
Start: 2024-03-22 | End: 2024-03-24 | Stop reason: HOSPADM

## 2024-03-22 RX ORDER — ACETAMINOPHEN 650 MG/1
650 SUPPOSITORY RECTAL EVERY 4 HOURS PRN
Status: DISCONTINUED | OUTPATIENT
Start: 2024-03-22 | End: 2024-03-24 | Stop reason: HOSPADM

## 2024-03-22 RX ORDER — IBUPROFEN 800 MG/1
800 TABLET ORAL EVERY 8 HOURS PRN
Status: ON HOLD | COMMUNITY
End: 2024-03-24 | Stop reason: ENTERED-IN-ERROR

## 2024-03-22 RX ADMIN — MORPHINE SULFATE 4 MG: 4 INJECTION, SOLUTION INTRAMUSCULAR; INTRAVENOUS at 16:14

## 2024-03-22 RX ADMIN — SODIUM CHLORIDE 1000 ML: 900 INJECTION, SOLUTION INTRAVENOUS at 16:14

## 2024-03-22 RX ADMIN — ONDANSETRON 4 MG: 2 INJECTION INTRAMUSCULAR; INTRAVENOUS at 14:59

## 2024-03-22 RX ADMIN — TAMSULOSIN HYDROCHLORIDE 0.4 MG: 0.4 CAPSULE ORAL at 19:42

## 2024-03-22 RX ADMIN — CEFTRIAXONE SODIUM 1 G: 1 INJECTION, SOLUTION INTRAVENOUS at 19:41

## 2024-03-22 RX ADMIN — MORPHINE SULFATE 4 MG: 4 INJECTION, SOLUTION INTRAMUSCULAR; INTRAVENOUS at 20:26

## 2024-03-22 RX ADMIN — SODIUM CHLORIDE 1000 ML: 900 INJECTION, SOLUTION INTRAVENOUS at 15:00

## 2024-03-22 RX ADMIN — SODIUM CHLORIDE 125 ML/HR: 900 INJECTION, SOLUTION INTRAVENOUS at 20:26

## 2024-03-22 RX ADMIN — PROCHLORPERAZINE EDISYLATE 10 MG: 5 INJECTION INTRAMUSCULAR; INTRAVENOUS at 16:14

## 2024-03-22 RX ADMIN — HEPARIN SODIUM 5000 UNITS: 5000 INJECTION, SOLUTION INTRAVENOUS; SUBCUTANEOUS at 20:32

## 2024-03-22 SDOH — SOCIAL STABILITY: SOCIAL INSECURITY: ARE YOU OR HAVE YOU BEEN THREATENED OR ABUSED PHYSICALLY, EMOTIONALLY, OR SEXUALLY BY ANYONE?: NO

## 2024-03-22 SDOH — HEALTH STABILITY: MENTAL HEALTH
STRESS IS WHEN SOMEONE FEELS TENSE, NERVOUS, ANXIOUS, OR CAN'T SLEEP AT NIGHT BECAUSE THEIR MIND IS TROUBLED. HOW STRESSED ARE YOU?: NOT AT ALL

## 2024-03-22 SDOH — SOCIAL STABILITY: SOCIAL NETWORK: ARE YOU MARRIED, WIDOWED, DIVORCED, SEPARATED, NEVER MARRIED, OR LIVING WITH A PARTNER?: MARRIED

## 2024-03-22 SDOH — HEALTH STABILITY: MENTAL HEALTH: HOW OFTEN DO YOU HAVE A DRINK CONTAINING ALCOHOL?: NEVER

## 2024-03-22 SDOH — SOCIAL STABILITY: SOCIAL INSECURITY: WITHIN THE LAST YEAR, HAVE YOU BEEN AFRAID OF YOUR PARTNER OR EX-PARTNER?: NO

## 2024-03-22 SDOH — ECONOMIC STABILITY: HOUSING INSECURITY
IN THE LAST 12 MONTHS, WAS THERE A TIME WHEN YOU DID NOT HAVE A STEADY PLACE TO SLEEP OR SLEPT IN A SHELTER (INCLUDING NOW)?: NO

## 2024-03-22 SDOH — HEALTH STABILITY: MENTAL HEALTH: HOW MANY STANDARD DRINKS CONTAINING ALCOHOL DO YOU HAVE ON A TYPICAL DAY?: PATIENT DOES NOT DRINK

## 2024-03-22 SDOH — HEALTH STABILITY: MENTAL HEALTH: HOW OFTEN DO YOU HAVE 6 OR MORE DRINKS ON ONE OCCASION?: NEVER

## 2024-03-22 SDOH — ECONOMIC STABILITY: INCOME INSECURITY: IN THE PAST 12 MONTHS, HAS THE ELECTRIC, GAS, OIL, OR WATER COMPANY THREATENED TO SHUT OFF SERVICE IN YOUR HOME?: NO

## 2024-03-22 SDOH — SOCIAL STABILITY: SOCIAL NETWORK: HOW OFTEN DO YOU ATTEND CHURCH OR RELIGIOUS SERVICES?: NEVER

## 2024-03-22 SDOH — SOCIAL STABILITY: SOCIAL INSECURITY: ABUSE: ADULT

## 2024-03-22 SDOH — ECONOMIC STABILITY: HOUSING INSECURITY: IN THE LAST 12 MONTHS, HOW MANY PLACES HAVE YOU LIVED?: 1

## 2024-03-22 SDOH — SOCIAL STABILITY: SOCIAL NETWORK
DO YOU BELONG TO ANY CLUBS OR ORGANIZATIONS SUCH AS CHURCH GROUPS UNIONS, FRATERNAL OR ATHLETIC GROUPS, OR SCHOOL GROUPS?: NO

## 2024-03-22 SDOH — SOCIAL STABILITY: SOCIAL INSECURITY
WITHIN THE LAST YEAR, HAVE YOU BEEN KICKED, HIT, SLAPPED, OR OTHERWISE PHYSICALLY HURT BY YOUR PARTNER OR EX-PARTNER?: NO

## 2024-03-22 SDOH — ECONOMIC STABILITY: FOOD INSECURITY: WITHIN THE PAST 12 MONTHS, THE FOOD YOU BOUGHT JUST DIDN'T LAST AND YOU DIDN'T HAVE MONEY TO GET MORE.: NEVER TRUE

## 2024-03-22 SDOH — SOCIAL STABILITY: SOCIAL NETWORK: HOW OFTEN DO YOU ATTENT MEETINGS OF THE CLUB OR ORGANIZATION YOU BELONG TO?: NEVER

## 2024-03-22 SDOH — SOCIAL STABILITY: SOCIAL INSECURITY
WITHIN THE LAST YEAR, HAVE TO BEEN RAPED OR FORCED TO HAVE ANY KIND OF SEXUAL ACTIVITY BY YOUR PARTNER OR EX-PARTNER?: NO

## 2024-03-22 SDOH — SOCIAL STABILITY: SOCIAL INSECURITY: WITHIN THE LAST YEAR, HAVE YOU BEEN HUMILIATED OR EMOTIONALLY ABUSED IN OTHER WAYS BY YOUR PARTNER OR EX-PARTNER?: NO

## 2024-03-22 SDOH — SOCIAL STABILITY: SOCIAL INSECURITY: DO YOU FEEL ANYONE HAS EXPLOITED OR TAKEN ADVANTAGE OF YOU FINANCIALLY OR OF YOUR PERSONAL PROPERTY?: NO

## 2024-03-22 SDOH — ECONOMIC STABILITY: TRANSPORTATION INSECURITY
IN THE PAST 12 MONTHS, HAS THE LACK OF TRANSPORTATION KEPT YOU FROM MEDICAL APPOINTMENTS OR FROM GETTING MEDICATIONS?: NO

## 2024-03-22 SDOH — SOCIAL STABILITY: SOCIAL INSECURITY: DOES ANYONE TRY TO KEEP YOU FROM HAVING/CONTACTING OTHER FRIENDS OR DOING THINGS OUTSIDE YOUR HOME?: NO

## 2024-03-22 SDOH — ECONOMIC STABILITY: INCOME INSECURITY: HOW HARD IS IT FOR YOU TO PAY FOR THE VERY BASICS LIKE FOOD, HOUSING, MEDICAL CARE, AND HEATING?: NOT HARD AT ALL

## 2024-03-22 SDOH — SOCIAL STABILITY: SOCIAL INSECURITY: DO YOU FEEL UNSAFE GOING BACK TO THE PLACE WHERE YOU ARE LIVING?: NO

## 2024-03-22 SDOH — SOCIAL STABILITY: SOCIAL NETWORK: HOW OFTEN DO YOU GET TOGETHER WITH FRIENDS OR RELATIVES?: NEVER

## 2024-03-22 SDOH — ECONOMIC STABILITY: FOOD INSECURITY: WITHIN THE PAST 12 MONTHS, YOU WORRIED THAT YOUR FOOD WOULD RUN OUT BEFORE YOU GOT MONEY TO BUY MORE.: NEVER TRUE

## 2024-03-22 SDOH — HEALTH STABILITY: PHYSICAL HEALTH: ON AVERAGE, HOW MANY MINUTES DO YOU ENGAGE IN EXERCISE AT THIS LEVEL?: 0 MIN

## 2024-03-22 SDOH — ECONOMIC STABILITY: TRANSPORTATION INSECURITY
IN THE PAST 12 MONTHS, HAS LACK OF TRANSPORTATION KEPT YOU FROM MEETINGS, WORK, OR FROM GETTING THINGS NEEDED FOR DAILY LIVING?: NO

## 2024-03-22 SDOH — ECONOMIC STABILITY: INCOME INSECURITY: IN THE LAST 12 MONTHS, WAS THERE A TIME WHEN YOU WERE NOT ABLE TO PAY THE MORTGAGE OR RENT ON TIME?: NO

## 2024-03-22 SDOH — SOCIAL STABILITY: SOCIAL INSECURITY: HAS ANYONE EVER THREATENED TO HURT YOUR FAMILY OR YOUR PETS?: NO

## 2024-03-22 SDOH — SOCIAL STABILITY: SOCIAL INSECURITY: ARE THERE ANY APPARENT SIGNS OF INJURIES/BEHAVIORS THAT COULD BE RELATED TO ABUSE/NEGLECT?: NO

## 2024-03-22 SDOH — SOCIAL STABILITY: SOCIAL NETWORK: IN A TYPICAL WEEK, HOW MANY TIMES DO YOU TALK ON THE PHONE WITH FAMILY, FRIENDS, OR NEIGHBORS?: NEVER

## 2024-03-22 SDOH — SOCIAL STABILITY: SOCIAL INSECURITY: WERE YOU ABLE TO COMPLETE ALL THE BEHAVIORAL HEALTH SCREENINGS?: YES

## 2024-03-22 SDOH — HEALTH STABILITY: PHYSICAL HEALTH: ON AVERAGE, HOW MANY DAYS PER WEEK DO YOU ENGAGE IN MODERATE TO STRENUOUS EXERCISE (LIKE A BRISK WALK)?: 0 DAYS

## 2024-03-22 SDOH — SOCIAL STABILITY: SOCIAL INSECURITY: HAVE YOU HAD THOUGHTS OF HARMING ANYONE ELSE?: NO

## 2024-03-22 ASSESSMENT — LIFESTYLE VARIABLES
AUDIT-C TOTAL SCORE: 0
AUDIT-C TOTAL SCORE: 0
HOW OFTEN DO YOU HAVE 6 OR MORE DRINKS ON ONE OCCASION: NEVER
HOW OFTEN DO YOU HAVE A DRINK CONTAINING ALCOHOL: NEVER
SKIP TO QUESTIONS 9-10: 1
AUDIT-C TOTAL SCORE: 0
SKIP TO QUESTIONS 9-10: 1
HOW MANY STANDARD DRINKS CONTAINING ALCOHOL DO YOU HAVE ON A TYPICAL DAY: PATIENT DOES NOT DRINK

## 2024-03-22 ASSESSMENT — ACTIVITIES OF DAILY LIVING (ADL)
JUDGMENT_ADEQUATE_SAFELY_COMPLETE_DAILY_ACTIVITIES: YES
WALKS IN HOME: INDEPENDENT
ADEQUATE_TO_COMPLETE_ADL: YES
GROOMING: INDEPENDENT
PATIENT'S MEMORY ADEQUATE TO SAFELY COMPLETE DAILY ACTIVITIES?: YES
TOILETING: INDEPENDENT
DRESSING YOURSELF: INDEPENDENT
LACK_OF_TRANSPORTATION: NO
HEARING - RIGHT EAR: FUNCTIONAL
FEEDING YOURSELF: INDEPENDENT
HEARING - LEFT EAR: FUNCTIONAL
LACK_OF_TRANSPORTATION: NO
BATHING: INDEPENDENT

## 2024-03-22 ASSESSMENT — PAIN - FUNCTIONAL ASSESSMENT
PAIN_FUNCTIONAL_ASSESSMENT: 0-10

## 2024-03-22 ASSESSMENT — COGNITIVE AND FUNCTIONAL STATUS - GENERAL
DAILY ACTIVITIY SCORE: 24
MOBILITY SCORE: 24
PATIENT BASELINE BEDBOUND: NO

## 2024-03-22 ASSESSMENT — PATIENT HEALTH QUESTIONNAIRE - PHQ9
2. FEELING DOWN, DEPRESSED OR HOPELESS: NOT AT ALL
SUM OF ALL RESPONSES TO PHQ9 QUESTIONS 1 & 2: 0
1. LITTLE INTEREST OR PLEASURE IN DOING THINGS: NOT AT ALL

## 2024-03-22 ASSESSMENT — PAIN DESCRIPTION - ORIENTATION: ORIENTATION: LEFT

## 2024-03-22 ASSESSMENT — COLUMBIA-SUICIDE SEVERITY RATING SCALE - C-SSRS
1. IN THE PAST MONTH, HAVE YOU WISHED YOU WERE DEAD OR WISHED YOU COULD GO TO SLEEP AND NOT WAKE UP?: NO
6. HAVE YOU EVER DONE ANYTHING, STARTED TO DO ANYTHING, OR PREPARED TO DO ANYTHING TO END YOUR LIFE?: NO
2. HAVE YOU ACTUALLY HAD ANY THOUGHTS OF KILLING YOURSELF?: NO

## 2024-03-22 ASSESSMENT — PAIN SCALES - GENERAL
PAINLEVEL_OUTOF10: 4
PAINLEVEL_OUTOF10: 3
PAINLEVEL_OUTOF10: 5 - MODERATE PAIN
PAINLEVEL_OUTOF10: 3
PAINLEVEL_OUTOF10: 2

## 2024-03-22 ASSESSMENT — PAIN DESCRIPTION - PAIN TYPE: TYPE: ACUTE PAIN

## 2024-03-22 ASSESSMENT — PAIN DESCRIPTION - LOCATION
LOCATION: ABDOMEN
LOCATION: BACK

## 2024-03-22 NOTE — CARE PLAN
Pt does not have a POA or Living Will  ADOD: 1 day    Pt lives at home with his  in a 2 story home with 2 steps to enter; 12 steps total inside the home  Pt works 5 days per week. He does not use home 02/cpcp/bipap  He ambulates without assistance  He is independent with ADL's  He has a hx of depression and anxiety, he is not currently being tx; denies S.I.  His PCP is Dr. Almendarez and he uses [facility] in [place]  He is here for kidney stone(s)    DISCHARGE PLAN: HOME WITH

## 2024-03-22 NOTE — PROGRESS NOTES
03/22/24 1820   Physical Activity   On average, how many days per week do you engage in moderate to strenuous exercise (like a brisk walk)? 0 days   On average, how many minutes do you engage in exercise at this level? 0 min   Financial Resource Strain   How hard is it for you to pay for the very basics like food, housing, medical care, and heating? Not hard   Housing Stability   In the last 12 months, was there a time when you were not able to pay the mortgage or rent on time? N   In the last 12 months, how many places have you lived? 1   In the last 12 months, was there a time when you did not have a steady place to sleep or slept in a shelter (including now)? N   Transportation Needs   In the past 12 months, has lack of transportation kept you from medical appointments or from getting medications? no   In the past 12 months, has lack of transportation kept you from meetings, work, or from getting things needed for daily living? No   Food Insecurity   Within the past 12 months, you worried that your food would run out before you got the money to buy more. Never true   Within the past 12 months, the food you bought just didn't last and you didn't have money to get more. Never true   Stress   Do you feel stress - tense, restless, nervous, or anxious, or unable to sleep at night because your mind is troubled all the time - these days? Not at all   Social Connections   In a typical week, how many times do you talk on the phone with family, friends, or neighbors? Never   How often do you get together with friends or relatives? Never   How often do you attend Taoism or Yazdanism services? Never   Do you belong to any clubs or organizations such as Taoism groups, unions, fraternal or athletic groups, or school groups? No   How often do you attend meetings of the clubs or organizations you belong to? Never   Are you , , , , never , or living with a partner?    Intimate  Partner Violence   Within the last year, have you been afraid of your partner or ex-partner? No   Within the last year, have you been humiliated or emotionally abused in other ways by your partner or ex-partner? No   Within the last year, have you been kicked, hit, slapped, or otherwise physically hurt by your partner or ex-partner? No   Within the last year, have you been raped or forced to have any kind of sexual activity by your partner or ex-partner? No   Alcohol Use   Q1: How often do you have a drink containing alcohol? Never   Q2: How many drinks containing alcohol do you have on a typical day when you are drinking? None   Q3: How often do you have six or more drinks on one occasion? Never   Utilities   In the past 12 months has the electric, gas, oil, or water company threatened to shut off services in your home? No

## 2024-03-22 NOTE — PROGRESS NOTES
03/22/24 1822   Discharge Planning   Living Arrangements Spouse/significant other   Support Systems Spouse/significant other   Type of Residence Private residence   Number of Stairs to Enter Residence 2   Number of Stairs Within Residence 12   Do you have animals or pets at home? No   Who is requesting discharge planning? Provider   Home or Post Acute Services None   Patient expects to be discharged to: Home   Does the patient need discharge transport arranged? No   Financial Resource Strain   How hard is it for you to pay for the very basics like food, housing, medical care, and heating? Not hard   Housing Stability   In the last 12 months, was there a time when you were not able to pay the mortgage or rent on time? N   In the last 12 months, how many places have you lived? 1   In the last 12 months, was there a time when you did not have a steady place to sleep or slept in a shelter (including now)? N   Transportation Needs   In the past 12 months, has lack of transportation kept you from medical appointments or from getting medications? no   In the past 12 months, has lack of transportation kept you from meetings, work, or from getting things needed for daily living? No   Patient Choice   Patient / Family choosing to utilize agency / facility established prior to hospitalization No

## 2024-03-22 NOTE — PROGRESS NOTES
03/22/24 1824   Current Planned Discharge Disposition   Current Planned Discharge Disposition Home

## 2024-03-22 NOTE — CONSULTS
Reason For Consult  Left ureteral stone    History Of Present Illness  Jet Pearl is a 53 y.o. male presenting with left sided flank pain.  CT scan identified a 6 m1m stone in the proximal left ureter.  His creatinine is elevated at 2, has CKD.  His creartinine was 1.28 5 months ago.     Past Medical History  He has a past medical history of Personal history of other infectious and parasitic diseases.    Surgical History  Bowel surgery     Social History  He reports that he has never smoked. He has never used smokeless tobacco. He reports that he does not drink alcohol and does not use drugs.    Family History  Family History   Problem Relation Name Age of Onset    Colon cancer Mother          Allergies  Patient has no known allergies.    Review of Systems  Left flank pain     Physical Exam  Alert, oriented  Normal resp effort  RRR  Abdomen soft, nontender  No penile or scrotal lesions     Last Recorded Vitals  Blood pressure (!) 166/95, pulse 77, temperature 36.6 °C (97.9 °F), resp. rate 19, SpO2 100 %.    Relevant Results      Labs and CT reviewed     Assessment/Plan     53 year old has a proximal left ureteral stone, CKD and MARTHA.  He has a mildly elevated WBC and has 6-1- WBC/HPF on UA.  He has been added on for ureteroscopy tomorrow.  NPO after MN        Maxx Somers MD

## 2024-03-22 NOTE — PROGRESS NOTES
03/22/24 1823   [facility] Disability Status   Are you deaf or do you have serious difficulty hearing? N   Are you blind or do you have serious difficulty seeing, even when wearing glasses? N   Because of a physical, mental, or emotional condition, do you have serious difficulty concentrating, remembering, or making decisions? (5 years old or older) N   Do you have serious difficulty walking or climbing stairs? N   Do you have serious difficulty dressing or bathing? N   Because of a physical, mental, or emotional condition, do you have serious difficulty doing errands alone such as visiting the doctor? N

## 2024-03-22 NOTE — ED PROVIDER NOTES
HPI   Chief Complaint   Patient presents with    Abdominal Pain     Pt c/o abdominal pain for 3 days with n/v       Patient is a 53-year-old male with past medical history of ileostomy, HIV, hypertension, type 2 diabetes presenting with left-sided abdominal pain.  States for the last 3 days or so he has been having left lower quadrant abdominal pain and some left flank pain.  Endorses nausea and vomiting 2 days ago as well states he has been having chills without fever.  Denies cough sore throat runny nose, chest pain, shortness of breath, urinary complaints, diarrhea.  States that he did have appendix first with ileostomy placed roughly 2 years ago that was reversed.  Denies other abdominal surgeries.  Patient does state that after several dietary changes, he no longer takes diabetes medication.  Also states that he does not take hypertension medicine anymore.  Does state that he is getting treated regularly for HIV.                          Pinon Coma Scale Score: 15                     Patient History   Past Medical History:   Diagnosis Date    Personal history of other infectious and parasitic diseases     History of syphilis     No past surgical history on file.  Family History   Problem Relation Name Age of Onset    Colon cancer Mother       Social History     Tobacco Use    Smoking status: Never    Smokeless tobacco: Never   Substance Use Topics    Alcohol use: Never    Drug use: Never       Physical Exam   ED Triage Vitals   Temperature Heart Rate Respirations BP   03/22/24 1437 03/22/24 1437 03/22/24 1437 03/22/24 1437   36.6 °C (97.9 °F) 77 19 (!) 166/95      Pulse Ox Temp src Heart Rate Source Patient Position   03/22/24 1442 -- -- --   100 %         BP Location FiO2 (%)     -- --             Physical Exam  Constitutional:       Appearance: He is well-developed.   HENT:      Head: Normocephalic and atraumatic.      Mouth/Throat:      Mouth: Mucous membranes are moist.      Pharynx: Oropharynx is clear.    Eyes:      Extraocular Movements: Extraocular movements intact.      Pupils: Pupils are equal, round, and reactive to light.   Cardiovascular:      Rate and Rhythm: Normal rate and regular rhythm.      Heart sounds: Normal heart sounds.   Pulmonary:      Effort: Pulmonary effort is normal.      Breath sounds: Normal breath sounds.   Abdominal:      General: Abdomen is flat. Bowel sounds are normal.      Palpations: Abdomen is soft.      Tenderness: There is abdominal tenderness in the left lower quadrant. There is no right CVA tenderness or left CVA tenderness.   Skin:     General: Skin is warm and dry.      Capillary Refill: Capillary refill takes less than 2 seconds.   Neurological:      General: No focal deficit present.      Mental Status: He is alert and oriented to person, place, and time.   Psychiatric:         Mood and Affect: Mood normal.         Behavior: Behavior normal.         ED Course & MDM   Diagnoses as of 03/22/24 1716   Left nephrolithiasis   Nausea and vomiting, unspecified vomiting type   Acute kidney injury (CMS/HCC)       Medical Decision Making  Patient is a 53-year-old male with past medical history of ileostomy, HLD, hypertension, type 2 diabetes presenting with left-sided abdominal pain.  CBC, CMP, lipase, UA, CT abdomen pelvis ordered.  IV fluids, Zofran ordered.  Conditions considered include but not limited to: Kidney stone, diverticulitis, appendicitis, cholecystitis, small bowel obstruction, covid, influenza.  Appendicitis unlikely due to patient stating he had a ruptured appendix several years ago that required surgical intervention.    I saw this patient in conjunction with Dr. Handley.  CMP does show acute kidney injury with creatinine of 2.0 and GFR of 39.  Lipase within normal limits.  Viral swabs are negative.  CBC does show elevated white count at 13.1.  UA with micro does show +3 blood.  There are no leukocytes or nitrates but does show some WBCs on micro.  CT abdomen pelvis  "does show a 6 x 5 mm kidney stone in the left proximal ureter with hydronephrosis and prominent left perinephric inflammatory stranding.    Patient states \"the Zofran took the edge off, but I am still feeling nauseous.  I am also having worsening abdominal pain. \"  IV morphine, more fluids, Compazine ordered.  Patient states that the morphine has completely gotten rid of his pain.    I spoke with Dr. Hendrix, urology.  We thoroughly discussed the patient's history, physical, laboratory imaging findings as well as ED course.  He states that typically these kidney stones can be treated outpatient but due to patient having an MARTHA, admission with urology consult is acceptable.    I called the observation unit, and spoke with Sammie JULIAN of the unit.  We thoroughly discussed the patient's history, physical, laboratory and imaging findings as well as ED course.  After discussion, she did state that this patient would qualify for the observation unit, however the unit does not have protocol for kidney stones so this patient was need to be admitted under another service.    Patients primary care doctor is Dr. Almendarez.  I spoke with the hospitalist, Dr. Silva.  We discussed the patient's history, physical, laboratory and imaging findings as well as ED course.  After discussion, it ultimately decided have the patient admitted for further management of MARTHA and kidney stone.  As I deem necessary from the patient's history, physical, laboratory imaging findings as well as ED course, I considered the above listed diagnoses.    Portions of this note made with Dragon software, please be mindful of potential grammatical errors.        Medications   ondansetron (Zofran) injection 4 mg (4 mg intravenous Given 3/22/24 1459)   sodium chloride 0.9 % bolus 1,000 mL (0 mL intravenous Stopped 3/22/24 1600)   morphine injection 4 mg (4 mg intravenous Given 3/22/24 1614)   sodium chloride 0.9 % bolus 1,000 mL (1,000 mL intravenous New Bag " 3/22/24 1614)   prochlorperazine (Compazine) injection 10 mg (10 mg intravenous Given 3/22/24 1614)         Labs Reviewed   COMPREHENSIVE METABOLIC PANEL - Abnormal       Result Value    Glucose 140 (*)     Sodium 141      Potassium 3.5      Chloride 103      Bicarbonate 22 (*)     Urea Nitrogen 18      Creatinine 2.00 (*)     eGFR 39 (*)     Calcium 9.5      Albumin 4.4      Alkaline Phosphatase 128 (*)     Total Protein 8.3 (*)     AST 23      Bilirubin, Total 0.8      ALT 17      Anion Gap 16     CBC WITH AUTO DIFFERENTIAL - Abnormal    WBC 13.1 (*)     nRBC 0.0      RBC 4.39 (*)     Hemoglobin 14.4      Hematocrit 43.1      MCV 98      MCH 32.8      MCHC 33.4      RDW 14.7 (*)     Platelets 238      Neutrophils % 80.0      Immature Granulocytes %, Automated 0.4      Lymphocytes % 14.2      Monocytes % 5.2      Eosinophils % 0.0      Basophils % 0.2      Neutrophils Absolute 10.45 (*)     Immature Granulocytes Absolute, Automated 0.05      Lymphocytes Absolute 1.85      Monocytes Absolute 0.68      Eosinophils Absolute 0.00      Basophils Absolute 0.02     URINALYSIS WITH REFLEX CULTURE AND MICROSCOPIC - Abnormal    Color, Urine Yellow      Appearance, Urine Turbid (*)     Specific Gravity, Urine 1.021      pH, Urine 6.0      Protein, Urine 50 (1+) (*)     Glucose, Urine 100 (1+) (*)     Blood, Urine OVER (3+) (*)     Ketones, Urine 20 (1+) (*)     Bilirubin, Urine NEGATIVE      Urobilinogen, Urine Normal      Nitrite, Urine NEGATIVE      Leukocyte Esterase, Urine NEGATIVE     URINALYSIS MICROSCOPIC WITH REFLEX CULTURE - Abnormal    WBC, Urine 6-10 (*)     RBC, Urine >20 (*)     Mucus, Urine FEW     LIPASE - Normal    Lipase 18     SARS-COV-2 AND INFLUENZA A/B PCR - Normal    Flu A Result Not Detected      Flu B Result Not Detected      Coronavirus 2019, PCR Not Detected      Narrative:     This assay has received FDA Emergency Use Authorization (EUA) and  is only authorized for the duration of time that  circumstances exist to justify the authorization of the emergency use of in vitro diagnostic tests for the detection of SARS-CoV-2 virus and/or diagnosis of COVID-19 infection under section 564(b)(1) of the Act, 21 U.S.C. 360bbb-3(b)(1). Testing for SARS-CoV-2 is only recommended for patients who meet current clinical and/or epidemiological criteria as defined by federal, state, or local public health directives. This assay is an in vitro diagnostic nucleic acid amplification test for the qualitative detection of SARS-CoV-2, Influenza A, and Influenza B from nasopharyngeal specimens and has been validated for use at [facility]. Negative results do not preclude COVID-19 infections or Influenza A/B infections, and should not be used as the sole basis for diagnosis, treatment, or other management decisions. If Influenza A/B and RSV PCR results are negative, testing for Parainfluenza virus, Adenovirus and Metapneumovirus is routinely performed for [facility] pediatric oncology and intensive care inpatients, and is available on other patients by placing an add-on request.    URINE CULTURE   URINALYSIS WITH REFLEX CULTURE AND MICROSCOPIC    Narrative:     The following orders were created for panel order Urinalysis with Reflex Culture and Microscopic.  Procedure                               Abnormality         Status                     ---------                               -----------         ------                     Urinalysis with Reflex C...[[id number]]  Abnormal            Final result               Extra Urine Gray Tube[[id number]]                            In process                   Please view results for these tests on the individual orders.   EXTRA URINE GRAY TUBE   MORPHOLOGY    RBC Morphology No significant RBC morphology present           CT abdomen pelvis wo IV contrast   Final Result   Moderate left hydro nephrosis to the level of a 6 x 5 mm calculus   within the proximal left ureter.  There is prominent left perinephric   inflammatory stranding.        Signed by: Lars Ramos 3/22/2024 4:24 PM   Dictation workstation:   [id number]            Procedure  Procedures     Ryan Perry PA-C  03/22/24 1716

## 2024-03-22 NOTE — H&P
History Of Present Illness  Jet Pearl is a 53 y.o. male presenting with flank pain  He began to experience symptoms 3 days ago, at night.  He had pain in the left flank which partially subsided overnight.  The pain began again the next morning.  It was intermittent, moderate to severe and nonradiating.  There was associated nausea and vomiting.  He had chills and sweating and did not check his temperature.  There was no diarrhea.  He has been taking ibuprofen for pain.  In the emergency department, he had an elevated white blood cell count of 13.1.  Creatinine was 2.0, up from a previous level of 1.4 on 2/12/2024.  A CT scan of the abdomen and pelvis showed a moderate left hydronephrosis to the level of a 5 mm calculus within the proximal left ureter.  There was prominent left perinephric stranding.  He is being admitted for management of the urinary stone.  Urology has been consulted and he has been seen by Dr. Somers.      Past Medical History  Past Medical History:   Diagnosis Date    Bowel perforation (CMS/[facility]) 05/2022    Diabetes mellitus, type 2 (CMS/[facility])     History of syphilis     HIV (human immunodeficiency virus infection) (CMS/[facility])     Hypertension        Surgical History  Past Surgical History:   Procedure Laterality Date    HEMICOLECTOMY Right 05/07/2022    Right hemicolectomy with anastomosis and diverting loop ileostomy, open drainage of pelvic abscess and open cholecystectomy    ILEOSTOMY CLOSURE  10/21/2022    Closure of ileostomy with small bowel resection and anastomosis        Social History  He reports that he has never smoked. He has never used smokeless tobacco. He reports current drug use. Drug: Marijuana. He reports that he does not drink alcohol.    Family History  Family History   Problem Relation Name Age of Onset    Colon cancer Mother      Cancer Maternal Grandfather      Stroke Paternal Grandmother          Allergies  Patient has no known allergies.    Review of Systems    General: As in the HPI   HEENT: Negative for headache, blurring of vision or double vision.    Cardiovascular: Negative for chest pain, palpitations or orthopnea.    Respiratory: Negative for cough, shortness of breath or wheezing.    Gastrointestinal: As in the HPI.   Genitourinary: Negative for dysuria, hematuria, frequency or nocturia.   Musculoskeletal: Negative for joint pain, joint swelling or deformity.   Skin: Negative for rash, itching, or jaundice.   Hematologic: Negative for bleeding or bruising.   Neurologic: Negative for headache, loss of consciousness. syncope or seizures       Physical Exam   General.: Awake alert well-developed, responsive   HEENT: Normocephalic, not icteric, not pale, no facial asymmetry, no pharyngeal erythema.   Neck: Supple, no carotid bruit, no thyroid enlargement.   Cardiovascular: Regular heart rate and rhythm normal S1 and S2.   Respiratory: Equal breath sounds bilaterally clear to auscultation.   Abdomen: Soft, nontender to palpation, bowel sounds present and normoactive. There was minimal left flank pain on palpation.    Extremities: No peripheral cyanosis, no pedal edema.   Neurologic: Alert and oriented to self, place and date, muscle strength 5/5 in all extremities.   Dermatologic: No rash, ecchymosis, or jaundice.   Psychological: Appropriate affect and behavior.     Last Recorded Vitals  Blood pressure (!) 166/95, pulse 77, temperature 36.6 °C (97.9 °F), resp. rate 19, SpO2 100 %.    Relevant Results  Results for orders placed or performed during the hospital encounter of 03/22/24 (from the past 24 hour(s))   Lipase   Result Value Ref Range    Lipase 18 16 - 63 U/L   Comprehensive Metabolic Panel   Result Value Ref Range    Glucose 140 (H) 65 - 99 mg/dL    Sodium 141 133 - 145 mmol/L    Potassium 3.5 3.4 - 5.1 mmol/L    Chloride 103 97 - 107 mmol/L    Bicarbonate 22 (L) 24 - 31 mmol/L    Urea Nitrogen 18 8 - 25 mg/dL    Creatinine 2.00 (H) 0.40 - 1.60 mg/dL    eGFR 39  (L) >60 mL/min/1.73m*2    Calcium 9.5 8.5 - 10.4 mg/dL    Albumin 4.4 3.5 - 5.0 g/dL    Alkaline Phosphatase 128 (H) 35 - 125 U/L    Total Protein 8.3 (H) 5.9 - 7.9 g/dL    AST 23 5 - 40 U/L    Bilirubin, Total 0.8 0.1 - 1.2 mg/dL    ALT 17 5 - 40 U/L    Anion Gap 16 <=19 mmol/L   CBC and Auto Differential   Result Value Ref Range    WBC 13.1 (H) 4.4 - 11.3 x10*3/uL    nRBC 0.0 0.0 - 0.0 /100 WBCs    RBC 4.39 (L) 4.50 - 5.90 x10*6/uL    Hemoglobin 14.4 13.5 - 17.5 g/dL    Hematocrit 43.1 41.0 - 52.0 %    MCV 98 80 - 100 fL    MCH 32.8 26.0 - 34.0 pg    MCHC 33.4 32.0 - 36.0 g/dL    RDW 14.7 (H) 11.5 - 14.5 %    Platelets 238 150 - 450 x10*3/uL    Neutrophils % 80.0 40.0 - 80.0 %    Immature Granulocytes %, Automated 0.4 0.0 - 0.9 %    Lymphocytes % 14.2 13.0 - 44.0 %    Monocytes % 5.2 2.0 - 10.0 %    Eosinophils % 0.0 0.0 - 6.0 %    Basophils % 0.2 0.0 - 2.0 %    Neutrophils Absolute 10.45 (H) 1.20 - 7.70 x10*3/uL    Immature Granulocytes Absolute, Automated 0.05 0.00 - 0.70 x10*3/uL    Lymphocytes Absolute 1.85 1.20 - 4.80 x10*3/uL    Monocytes Absolute 0.68 0.10 - 1.00 x10*3/uL    Eosinophils Absolute 0.00 0.00 - 0.70 x10*3/uL    Basophils Absolute 0.02 0.00 - 0.10 x10*3/uL   Morphology   Result Value Ref Range    RBC Morphology No significant RBC morphology present    Sars-CoV-2 and Influenza A/B PCR   Result Value Ref Range    Flu A Result Not Detected Not Detected    Flu B Result Not Detected Not Detected    Coronavirus 2019, PCR Not Detected Not Detected   Urinalysis with Reflex Culture and Microscopic   Result Value Ref Range    Color, Urine Yellow Light-Yellow, Yellow, Dark-Yellow    Appearance, Urine Turbid (N) Clear    Specific Gravity, Urine 1.021 1.005 - 1.035    pH, Urine 6.0 5.0, 5.5, 6.0, 6.5, 7.0, 7.5, 8.0    Protein, Urine 50 (1+) (A) NEGATIVE, 10 (TRACE), 20 (TRACE) mg/dL    Glucose, Urine 100 (1+) (A) Normal mg/dL    Blood, Urine OVER (3+) (A) NEGATIVE    Ketones, Urine 20 (1+) (A) NEGATIVE mg/dL     Bilirubin, Urine NEGATIVE NEGATIVE    Urobilinogen, Urine Normal Normal mg/dL    Nitrite, Urine NEGATIVE NEGATIVE    Leukocyte Esterase, Urine NEGATIVE NEGATIVE   Urinalysis Microscopic   Result Value Ref Range    WBC, Urine 6-10 (A) 1-5, NONE /HPF    RBC, Urine >20 (A) NONE, 1-2, 3-5 /HPF    Mucus, Urine FEW Reference range not established. /LPF   Sodium, Urine Random   Result Value Ref Range    Sodium, Urine Random 158 NOT ESTABLISHED mmol/L    Creatinine, Urine Random 172.9 NOT ESTABLISHED mg/dL    Sodium/Creatinine Ratio 91 Not established. mmol/g Creat     CT abdomen pelvis wo IV contrast    Result Date: 3/22/2024  Interpreted By:  Lars Ramos, STUDY: CT ABDOMEN PELVIS WO IV CONTRAST; 3/22/2024 3:59 pm   INDICATION: Signs/Symptoms:abdominal pain;   COMPARISON: 06/07/2022   ACCESSION NUMBER(S): [id number]   ORDERING CLINICIAN: STANISLAV JAIN   TECHNIQUE: Contiguous axial images from the lung bases through the abdomen and pelvis were performed. Additionally, coronal and sagittal reconstructed images were obtained. All CT examinations are performed with 1 or more of the following dose reduction techniques: Automated exposure control, adjustment of mA and/or kv according to patient's size, or use of iterative reconstruction techniques.     FINDINGS: Evaluation of the solid viscera is suboptimal due to the lack of intravenous contrast.   Limited images of the lower thorax: No pleural or pericardial effusion.   The liver, common bile duct, pancreas, spleen, and adrenal glands are unremarkable. Prior cholecystectomy with surgical clips in the gallbladder fossa again noted.   Kidneys: There is moderate left hydro nephrosis to the level of a 6 x 5 mm calculus within the proximal left ureter. There is prominent left perinephric inflammatory stranding. No other calculi are seen within the kidneys bilaterally. No right hydronephrosis.   Urinary bladder: Normally distended and otherwise unremarkable.   The  visualized aorta is unremarkable.   The bowel is nondistended without evidence for acute pathology. No acute inflammatory changes of the colon.   No ascites or free intraperitoneal air within the abdomen or pelvis is seen.   The visualized osseous structures are intact.         Moderate left hydro nephrosis to the level of a 6 x 5 mm calculus within the proximal left ureter. There is prominent left perinephric inflammatory stranding.   Signed by: Lars Ramos 3/22/2024 4:24 PM Dictation workstation:   [id number]        Assessment/Plan   Principal Problem:    Left ureteral stone  Active Problems:    Left nephrolithiasis    Left hydronephrosis with calculus in the left ureter  Urology on consult.  Cystoscopy and ureteroscopy planned for tomorrow  He has been started on Flomax  Pain medication as needed    Urinary tract infection  Empiric IV antibiotic therapy with ceftriaxone  Urine cultures in progress.  Blood cultures are ordered because of left ureter calculus    Acute kidney injury  May be secondary to volume depletion from diarrhea and also possibly from use of ibuprofen  Intravenous fluids  Avoid nephrotoxic medication.  Nephrology consult if kidney function fails to improve    HIV infection  Genvoya.  Hold today because of MARTHA.    Elevated blood pressure  He has a history of hypertension.  He had taken lisinopril in the past and reports his antihypertensive medication was stopped long time ago and his blood pressures have been normal  IV hydralazine as needed for elevated blood pressure    Hyperglycemia  Check hemoglobin A1C      Ritu Silva MD

## 2024-03-23 ENCOUNTER — ANESTHESIA EVENT (OUTPATIENT)
Dept: OPERATING ROOM | Facility: HOSPITAL | Age: 54
End: 2024-03-23
Payer: MEDICAID

## 2024-03-23 ENCOUNTER — APPOINTMENT (OUTPATIENT)
Dept: RADIOLOGY | Facility: HOSPITAL | Age: 54
End: 2024-03-23
Payer: MEDICAID

## 2024-03-23 ENCOUNTER — ANESTHESIA (OUTPATIENT)
Dept: OPERATING ROOM | Facility: HOSPITAL | Age: 54
End: 2024-03-23
Payer: MEDICAID

## 2024-03-23 PROBLEM — G47.33 OSA (OBSTRUCTIVE SLEEP APNEA): Status: ACTIVE | Noted: 2023-08-22

## 2024-03-23 LAB
ALBUMIN SERPL-MCNC: 3.8 G/DL (ref 3.5–5)
ANION GAP SERPL CALC-SCNC: 14 MMOL/L
BUN SERPL-MCNC: 16 MG/DL (ref 8–25)
CALCIUM SERPL-MCNC: 8.8 MG/DL (ref 8.5–10.4)
CHLORIDE SERPL-SCNC: 109 MMOL/L (ref 97–107)
CO2 SERPL-SCNC: 20 MMOL/L (ref 24–31)
CREAT SERPL-MCNC: 1.6 MG/DL (ref 0.4–1.6)
EGFRCR SERPLBLD CKD-EPI 2021: 51 ML/MIN/1.73M*2
ERYTHROCYTE [DISTWIDTH] IN BLOOD BY AUTOMATED COUNT: 15 % (ref 11.5–14.5)
EST. AVERAGE GLUCOSE BLD GHB EST-MCNC: 97 MG/DL
GLUCOSE BLD MANUAL STRIP-MCNC: 100 MG/DL (ref 74–99)
GLUCOSE SERPL-MCNC: 99 MG/DL (ref 65–99)
HBA1C MFR BLD: 5 %
HCT VFR BLD AUTO: 39.4 % (ref 41–52)
HGB BLD-MCNC: 13.1 G/DL (ref 13.5–17.5)
HOLD SPECIMEN: NORMAL
MCH RBC QN AUTO: 32.7 PG (ref 26–34)
MCHC RBC AUTO-ENTMCNC: 33.2 G/DL (ref 32–36)
MCV RBC AUTO: 98 FL (ref 80–100)
NRBC BLD-RTO: 0 /100 WBCS (ref 0–0)
PHOSPHATE SERPL-MCNC: 2.1 MG/DL (ref 2.5–4.5)
PLATELET # BLD AUTO: 214 X10*3/UL (ref 150–450)
POTASSIUM SERPL-SCNC: 3.2 MMOL/L (ref 3.4–5.1)
RBC # BLD AUTO: 4.01 X10*6/UL (ref 4.5–5.9)
SODIUM SERPL-SCNC: 143 MMOL/L (ref 133–145)
WBC # BLD AUTO: 16.4 X10*3/UL (ref 4.4–11.3)

## 2024-03-23 PROCEDURE — 2500000002 HC RX 250 W HCPCS SELF ADMINISTERED DRUGS (ALT 637 FOR MEDICARE OP, ALT 636 FOR OP/ED): Performed by: UROLOGY

## 2024-03-23 PROCEDURE — 82947 ASSAY GLUCOSE BLOOD QUANT: CPT

## 2024-03-23 PROCEDURE — 3600000008 HC OR TIME - EACH INCREMENTAL 1 MINUTE - PROCEDURE LEVEL THREE: Performed by: UROLOGY

## 2024-03-23 PROCEDURE — 2500000004 HC RX 250 GENERAL PHARMACY W/ HCPCS (ALT 636 FOR OP/ED): Performed by: UROLOGY

## 2024-03-23 PROCEDURE — 7100000001 HC RECOVERY ROOM TIME - INITIAL BASE CHARGE: Performed by: UROLOGY

## 2024-03-23 PROCEDURE — 2500000005 HC RX 250 GENERAL PHARMACY W/O HCPCS: Performed by: ANESTHESIOLOGY

## 2024-03-23 PROCEDURE — 76000 FLUOROSCOPY <1 HR PHYS/QHP: CPT

## 2024-03-23 PROCEDURE — 0TF78ZZ FRAGMENTATION IN LEFT URETER, VIA NATURAL OR ARTIFICIAL OPENING ENDOSCOPIC: ICD-10-PCS | Performed by: UROLOGY

## 2024-03-23 PROCEDURE — 1210000001 HC SEMI-PRIVATE ROOM DAILY

## 2024-03-23 PROCEDURE — 80069 RENAL FUNCTION PANEL: CPT | Performed by: INTERNAL MEDICINE

## 2024-03-23 PROCEDURE — 3600000003 HC OR TIME - INITIAL BASE CHARGE - PROCEDURE LEVEL THREE: Performed by: UROLOGY

## 2024-03-23 PROCEDURE — A52356 PR CYSTO/URETERO W/LITHOTRIPSY  AND INDWELL STENT INSRT: Performed by: ANESTHESIOLOGIST ASSISTANT

## 2024-03-23 PROCEDURE — 0T778DZ DILATION OF LEFT URETER WITH INTRALUMINAL DEVICE, VIA NATURAL OR ARTIFICIAL OPENING ENDOSCOPIC: ICD-10-PCS | Performed by: UROLOGY

## 2024-03-23 PROCEDURE — 3700000002 HC GENERAL ANESTHESIA TIME - EACH INCREMENTAL 1 MINUTE: Performed by: UROLOGY

## 2024-03-23 PROCEDURE — 83036 HEMOGLOBIN GLYCOSYLATED A1C: CPT | Performed by: INTERNAL MEDICINE

## 2024-03-23 PROCEDURE — 36415 COLL VENOUS BLD VENIPUNCTURE: CPT | Performed by: INTERNAL MEDICINE

## 2024-03-23 PROCEDURE — 2500000004 HC RX 250 GENERAL PHARMACY W/ HCPCS (ALT 636 FOR OP/ED): Performed by: ANESTHESIOLOGIST ASSISTANT

## 2024-03-23 PROCEDURE — 85027 COMPLETE CBC AUTOMATED: CPT | Performed by: INTERNAL MEDICINE

## 2024-03-23 PROCEDURE — 2500000005 HC RX 250 GENERAL PHARMACY W/O HCPCS: Performed by: ANESTHESIOLOGIST ASSISTANT

## 2024-03-23 PROCEDURE — 3700000001 HC GENERAL ANESTHESIA TIME - INITIAL BASE CHARGE: Performed by: UROLOGY

## 2024-03-23 PROCEDURE — 7100000002 HC RECOVERY ROOM TIME - EACH INCREMENTAL 1 MINUTE: Performed by: UROLOGY

## 2024-03-23 PROCEDURE — 2780000003 HC OR 278 NO HCPCS: Performed by: UROLOGY

## 2024-03-23 PROCEDURE — 2500000004 HC RX 250 GENERAL PHARMACY W/ HCPCS (ALT 636 FOR OP/ED): Performed by: INTERNAL MEDICINE

## 2024-03-23 PROCEDURE — C1769 GUIDE WIRE: HCPCS | Performed by: UROLOGY

## 2024-03-23 PROCEDURE — A52356 PR CYSTO/URETERO W/LITHOTRIPSY  AND INDWELL STENT INSRT: Performed by: ANESTHESIOLOGY

## 2024-03-23 PROCEDURE — C2617 STENT, NON-COR, TEM W/O DEL: HCPCS | Performed by: UROLOGY

## 2024-03-23 PROCEDURE — 2720000007 HC OR 272 NO HCPCS: Performed by: UROLOGY

## 2024-03-23 PROCEDURE — 74300 X-RAY BILE DUCTS/PANCREAS: CPT | Performed by: STUDENT IN AN ORGANIZED HEALTH CARE EDUCATION/TRAINING PROGRAM

## 2024-03-23 PROCEDURE — 2500000001 HC RX 250 WO HCPCS SELF ADMINISTERED DRUGS (ALT 637 FOR MEDICARE OP): Performed by: INTERNAL MEDICINE

## 2024-03-23 PROCEDURE — 2500000001 HC RX 250 WO HCPCS SELF ADMINISTERED DRUGS (ALT 637 FOR MEDICARE OP): Performed by: UROLOGY

## 2024-03-23 DEVICE — INLAY OPTIMA URETERAL STENT W/O GUIDEWIRE
Type: IMPLANTABLE DEVICE | Site: URETER | Status: FUNCTIONAL
Brand: BARD® INLAY OPTIMA® URETERAL STENT

## 2024-03-23 RX ORDER — ROCURONIUM BROMIDE 10 MG/ML
INJECTION, SOLUTION INTRAVENOUS AS NEEDED
Status: DISCONTINUED | OUTPATIENT
Start: 2024-03-23 | End: 2024-03-23

## 2024-03-23 RX ORDER — SUCCINYLCHOLINE CHLORIDE 20 MG/ML
INJECTION INTRAMUSCULAR; INTRAVENOUS AS NEEDED
Status: DISCONTINUED | OUTPATIENT
Start: 2024-03-23 | End: 2024-03-23

## 2024-03-23 RX ORDER — KETOROLAC TROMETHAMINE 30 MG/ML
INJECTION, SOLUTION INTRAMUSCULAR; INTRAVENOUS AS NEEDED
Status: DISCONTINUED | OUTPATIENT
Start: 2024-03-23 | End: 2024-03-23

## 2024-03-23 RX ORDER — MIDAZOLAM HYDROCHLORIDE 1 MG/ML
INJECTION, SOLUTION INTRAMUSCULAR; INTRAVENOUS AS NEEDED
Status: DISCONTINUED | OUTPATIENT
Start: 2024-03-23 | End: 2024-03-23

## 2024-03-23 RX ORDER — FAMOTIDINE 10 MG/ML
INJECTION INTRAVENOUS AS NEEDED
Status: DISCONTINUED | OUTPATIENT
Start: 2024-03-23 | End: 2024-03-23

## 2024-03-23 RX ORDER — POTASSIUM CHLORIDE 1.5 G/1.58G
40 POWDER, FOR SOLUTION ORAL ONCE
Status: COMPLETED | OUTPATIENT
Start: 2024-03-23 | End: 2024-03-23

## 2024-03-23 RX ORDER — ALBUTEROL SULFATE 0.83 MG/ML
2.5 SOLUTION RESPIRATORY (INHALATION) ONCE AS NEEDED
Status: DISCONTINUED | OUTPATIENT
Start: 2024-03-23 | End: 2024-03-23 | Stop reason: HOSPADM

## 2024-03-23 RX ORDER — LIDOCAINE HYDROCHLORIDE 10 MG/ML
INJECTION INFILTRATION; PERINEURAL AS NEEDED
Status: DISCONTINUED | OUTPATIENT
Start: 2024-03-23 | End: 2024-03-23

## 2024-03-23 RX ORDER — NEOSTIGMINE METHYLSULFATE 1 MG/ML
INJECTION, SOLUTION INTRAVENOUS AS NEEDED
Status: DISCONTINUED | OUTPATIENT
Start: 2024-03-23 | End: 2024-03-23

## 2024-03-23 RX ORDER — HYDRALAZINE HYDROCHLORIDE 20 MG/ML
5 INJECTION INTRAMUSCULAR; INTRAVENOUS EVERY 30 MIN PRN
Status: DISCONTINUED | OUTPATIENT
Start: 2024-03-23 | End: 2024-03-23 | Stop reason: HOSPADM

## 2024-03-23 RX ORDER — SODIUM CHLORIDE, SODIUM LACTATE, POTASSIUM CHLORIDE, CALCIUM CHLORIDE 600; 310; 30; 20 MG/100ML; MG/100ML; MG/100ML; MG/100ML
100 INJECTION, SOLUTION INTRAVENOUS CONTINUOUS
Status: ACTIVE | OUTPATIENT
Start: 2024-03-23 | End: 2024-03-23

## 2024-03-23 RX ORDER — OXYCODONE HYDROCHLORIDE 5 MG/1
5 TABLET ORAL EVERY 4 HOURS PRN
Status: DISCONTINUED | OUTPATIENT
Start: 2024-03-23 | End: 2024-03-23 | Stop reason: HOSPADM

## 2024-03-23 RX ORDER — GLYCOPYRROLATE 0.2 MG/ML
INJECTION INTRAMUSCULAR; INTRAVENOUS AS NEEDED
Status: DISCONTINUED | OUTPATIENT
Start: 2024-03-23 | End: 2024-03-23

## 2024-03-23 RX ORDER — PROPOFOL 10 MG/ML
INJECTION, EMULSION INTRAVENOUS AS NEEDED
Status: DISCONTINUED | OUTPATIENT
Start: 2024-03-23 | End: 2024-03-23

## 2024-03-23 RX ORDER — ONDANSETRON HYDROCHLORIDE 2 MG/ML
4 INJECTION, SOLUTION INTRAVENOUS ONCE AS NEEDED
Status: DISCONTINUED | OUTPATIENT
Start: 2024-03-23 | End: 2024-03-23 | Stop reason: HOSPADM

## 2024-03-23 RX ORDER — METOCLOPRAMIDE HYDROCHLORIDE 5 MG/ML
INJECTION INTRAMUSCULAR; INTRAVENOUS AS NEEDED
Status: DISCONTINUED | OUTPATIENT
Start: 2024-03-23 | End: 2024-03-23

## 2024-03-23 RX ORDER — DIPHENHYDRAMINE HYDROCHLORIDE 50 MG/ML
12.5 INJECTION INTRAMUSCULAR; INTRAVENOUS ONCE AS NEEDED
Status: DISCONTINUED | OUTPATIENT
Start: 2024-03-23 | End: 2024-03-23 | Stop reason: HOSPADM

## 2024-03-23 RX ORDER — SODIUM,POTASSIUM PHOSPHATES 280-250MG
1 POWDER IN PACKET (EA) ORAL EVERY 8 HOURS
Status: COMPLETED | OUTPATIENT
Start: 2024-03-23 | End: 2024-03-23

## 2024-03-23 RX ORDER — LIDOCAINE HYDROCHLORIDE 10 MG/ML
INJECTION, SOLUTION EPIDURAL; INFILTRATION; INTRACAUDAL; PERINEURAL AS NEEDED
Status: DISCONTINUED | OUTPATIENT
Start: 2024-03-23 | End: 2024-03-23

## 2024-03-23 RX ORDER — FENTANYL CITRATE 50 UG/ML
INJECTION, SOLUTION INTRAMUSCULAR; INTRAVENOUS AS NEEDED
Status: DISCONTINUED | OUTPATIENT
Start: 2024-03-23 | End: 2024-03-23

## 2024-03-23 RX ORDER — ONDANSETRON HYDROCHLORIDE 2 MG/ML
INJECTION, SOLUTION INTRAVENOUS AS NEEDED
Status: DISCONTINUED | OUTPATIENT
Start: 2024-03-23 | End: 2024-03-23

## 2024-03-23 RX ORDER — IPRATROPIUM BROMIDE 0.5 MG/2.5ML
500 SOLUTION RESPIRATORY (INHALATION) ONCE
Status: DISCONTINUED | OUTPATIENT
Start: 2024-03-23 | End: 2024-03-23 | Stop reason: HOSPADM

## 2024-03-23 RX ADMIN — ROCURONIUM BROMIDE 25 MG: 10 INJECTION, SOLUTION INTRAVENOUS at 08:24

## 2024-03-23 RX ADMIN — CEFTRIAXONE SODIUM 1 G: 1 INJECTION, SOLUTION INTRAVENOUS at 18:05

## 2024-03-23 RX ADMIN — ONDANSETRON HYDROCHLORIDE 4 MG: 2 INJECTION INTRAMUSCULAR; INTRAVENOUS at 08:42

## 2024-03-23 RX ADMIN — NEOSTIGMINE METHYLSULFATE 5 MG: 1 INJECTION, SOLUTION INTRAVENOUS at 08:49

## 2024-03-23 RX ADMIN — DEXAMETHASONE SODIUM PHOSPHATE 8 MG: 4 INJECTION, SOLUTION INTRA-ARTICULAR; INTRALESIONAL; INTRAMUSCULAR; INTRAVENOUS; SOFT TISSUE at 08:27

## 2024-03-23 RX ADMIN — KETOROLAC TROMETHAMINE 30 MG: 30 INJECTION, SOLUTION INTRAMUSCULAR at 08:42

## 2024-03-23 RX ADMIN — MORPHINE SULFATE 4 MG: 4 INJECTION, SOLUTION INTRAMUSCULAR; INTRAVENOUS at 06:46

## 2024-03-23 RX ADMIN — MORPHINE SULFATE 4 MG: 4 INJECTION, SOLUTION INTRAMUSCULAR; INTRAVENOUS at 03:00

## 2024-03-23 RX ADMIN — SODIUM CHLORIDE, SODIUM LACTATE, POTASSIUM CHLORIDE, AND CALCIUM CHLORIDE: 600; 310; 30; 20 INJECTION, SOLUTION INTRAVENOUS at 08:11

## 2024-03-23 RX ADMIN — POTASSIUM & SODIUM PHOSPHATES POWDER PACK 280-160-250 MG 1 PACKET: 280-160-250 PACK at 20:57

## 2024-03-23 RX ADMIN — LIDOCAINE HYDROCHLORIDE 5 ML: 10 INJECTION, SOLUTION INFILTRATION; PERINEURAL at 08:17

## 2024-03-23 RX ADMIN — HEPARIN SODIUM 5000 UNITS: 5000 INJECTION, SOLUTION INTRAVENOUS; SUBCUTANEOUS at 21:06

## 2024-03-23 RX ADMIN — MIDAZOLAM 2 MG: 1 INJECTION INTRAMUSCULAR; INTRAVENOUS at 08:17

## 2024-03-23 RX ADMIN — ROCURONIUM BROMIDE 5 MG: 10 INJECTION, SOLUTION INTRAVENOUS at 08:31

## 2024-03-23 RX ADMIN — TAMSULOSIN HYDROCHLORIDE 0.4 MG: 0.4 CAPSULE ORAL at 18:04

## 2024-03-23 RX ADMIN — SODIUM CHLORIDE 125 ML/HR: 900 INJECTION, SOLUTION INTRAVENOUS at 03:00

## 2024-03-23 RX ADMIN — Medication 5 L/MIN: at 09:00

## 2024-03-23 RX ADMIN — SODIUM CHLORIDE 125 ML/HR: 900 INJECTION, SOLUTION INTRAVENOUS at 20:57

## 2024-03-23 RX ADMIN — HEPARIN SODIUM 5000 UNITS: 5000 INJECTION, SOLUTION INTRAVENOUS; SUBCUTANEOUS at 14:30

## 2024-03-23 RX ADMIN — SODIUM CHLORIDE, SODIUM LACTATE, POTASSIUM CHLORIDE, AND CALCIUM CHLORIDE: 600; 310; 30; 20 INJECTION, SOLUTION INTRAVENOUS at 08:44

## 2024-03-23 RX ADMIN — FAMOTIDINE 20 MG: 10 INJECTION, SOLUTION INTRAVENOUS at 08:11

## 2024-03-23 RX ADMIN — POTASSIUM & SODIUM PHOSPHATES POWDER PACK 280-160-250 MG 1 PACKET: 280-160-250 PACK at 14:27

## 2024-03-23 RX ADMIN — SUCCINYLCHOLINE CHLORIDE 120 MG: 20 INJECTION, SOLUTION INTRAMUSCULAR; INTRAVENOUS at 08:17

## 2024-03-23 RX ADMIN — FENTANYL CITRATE 25 MCG: 0.05 INJECTION, SOLUTION INTRAMUSCULAR; INTRAVENOUS at 08:25

## 2024-03-23 RX ADMIN — MULTIVITAMIN TABLET 1 TABLET: TABLET at 18:04

## 2024-03-23 RX ADMIN — ONDANSETRON 4 MG: 2 INJECTION INTRAMUSCULAR; INTRAVENOUS at 05:10

## 2024-03-23 RX ADMIN — GLYCOPYRROLATE 1 MG: 0.2 INJECTION INTRAMUSCULAR; INTRAVENOUS at 08:49

## 2024-03-23 RX ADMIN — FENTANYL CITRATE 25 MCG: 0.05 INJECTION, SOLUTION INTRAMUSCULAR; INTRAVENOUS at 08:36

## 2024-03-23 RX ADMIN — POTASSIUM CHLORIDE 40 MEQ: 1.5 FOR SOLUTION ORAL at 14:27

## 2024-03-23 RX ADMIN — METOCLOPRAMIDE 10 MG: 5 INJECTION, SOLUTION INTRAMUSCULAR; INTRAVENOUS at 08:11

## 2024-03-23 RX ADMIN — LATANOPROST 1 DROP: 50 SOLUTION OPHTHALMIC at 20:57

## 2024-03-23 RX ADMIN — PROPOFOL 200 MG: 10 INJECTION, EMULSION INTRAVENOUS at 08:17

## 2024-03-23 SDOH — HEALTH STABILITY: MENTAL HEALTH: CURRENT SMOKER: 0

## 2024-03-23 ASSESSMENT — COGNITIVE AND FUNCTIONAL STATUS - GENERAL
DAILY ACTIVITIY SCORE: 24
MOBILITY SCORE: 24

## 2024-03-23 ASSESSMENT — PAIN SCALES - GENERAL
PAINLEVEL_OUTOF10: 5 - MODERATE PAIN
PAINLEVEL_OUTOF10: 0 - NO PAIN
PAIN_LEVEL: 0
PAINLEVEL_OUTOF10: 0 - NO PAIN
PAINLEVEL_OUTOF10: 8
PAINLEVEL_OUTOF10: 0 - NO PAIN
PAINLEVEL_OUTOF10: 6

## 2024-03-23 ASSESSMENT — PAIN - FUNCTIONAL ASSESSMENT
PAIN_FUNCTIONAL_ASSESSMENT: 0-10

## 2024-03-23 ASSESSMENT — PAIN DESCRIPTION - LOCATION
LOCATION: BACK
LOCATION: BACK

## 2024-03-23 ASSESSMENT — PAIN DESCRIPTION - ORIENTATION
ORIENTATION: LEFT
ORIENTATION: LEFT

## 2024-03-23 NOTE — OP NOTE
Cystoscopy with Lithotripsy Laser (L), Cystoscopy with Insertion Stent Ureter (L) Operative Note     Date: 3/22/2024 - 3/23/2024  OR Location: [facility]    Name: Jet Pearl : 1970, Age: 53 y.o., MRN: [id number], Sex: male    Diagnosis  Pre-op Diagnosis     * Left ureteral stone [N20.1] Post-op Diagnosis     * Left ureteral stone [N20.1]     Procedures  Cystoscopy with Lithotripsy Laser  89899 - UT CYSTO/URETERO W/LITHOTRIPSY &INDWELL STENT INSRT    Cystoscopy with Insertion Stent Ureter  53757 - UT CYSTO W/INSERT URETERAL STENT      Surgeons      * Arnav Hendrix - Primary    Resident/Fellow/Other Assistant:  Surgeon(s) and Role:    Procedure Summary  Anesthesia: General  ASA: III  Anesthesia Staff: Anesthesiologist: Benigno Alvarado MD  C-AA: DEBBI Galan  Estimated Blood Loss: 0 mL  Intra-op Medications:   Administrations occurring from 0800 to 0845 on 24:   Medication Name Total Dose   cefTRIAXone (Rocephin) IVPB 1 g Cannot be calculated   heparin (porcine) injection 5,000 Units Cannot be calculated   latanoprost (Xalatan) 0.005 % ophthalmic solution 1 drop Cannot be calculated   melatonin tablet 5 mg Cannot be calculated   morphine injection 2 mg Cannot be calculated   morphine injection 4 mg Cannot be calculated   multivitamin 1 tablet Cannot be calculated   polyethylene glycol (Glycolax, Miralax) packet 17 g Cannot be calculated   tamsulosin (Flomax) 24 hr capsule 0.4 mg Cannot be calculated   timolol (Timoptic) 0.5 % ophthalmic solution 1 drop Cannot be calculated              Anesthesia Record               Intraprocedure I/O Totals          Intake    LR bolus 1000.00 mL    Total Intake 1000 mL          Specimen: No specimens collected     Staff:   Circulator: Sylvia Mendoza RN  Scrub Person: Glendy Finney         Drains and/or Catheters: * None in log *    Tourniquet Times:         Implants:  Implants       Type Name Action Serial No.      Stent STENT, INLAY OPTIMA, 6FR X 26CM -  [id number] Implanted               Findings: Stone in the left mid ureter    Indications: Jet Pearl is an 53 y.o. male who is having surgery for Left ureteral stone [N20.1].     The patient was seen in the preoperative area. The risks, benefits, complications, treatment options, non-operative alternatives, expected recovery and outcomes were discussed with the patient. The possibilities of reaction to medication, pulmonary aspiration, injury to surrounding structures, bleeding, recurrent infection, the need for additional procedures, failure to diagnose a condition, and creating a complication requiring transfusion or operation were discussed with the patient. The patient concurred with the proposed plan, giving informed consent.  The site of surgery was properly noted/marked if necessary per policy. The patient has been actively warmed in preoperative area. Preoperative antibiotics  Venous thrombosis prophylaxis     Procedure Details: Patient was taken to the operating room placed under general anesthesia he was then placed in the dorsolithotomy position and prepped and draped in a sterile manner.  The patient underwent cystoscopy.  This revealed no obvious abnormalities.  The left ureteral orifice was visualized and an open-ended flexible tip catheter was placed up the ureter with the help of a guidewire.  The guidewire was then advanced all the way up into the kidney beyond the stone.  The flexible tip catheter was removed and the ureteroscope was passed with the help of the second guidewire up to the stone.  The stone was not embedded and floating freely and I felt that if I started with the laser at that point the stone would move up into the kidney.  As a result the stone was engaged in the stone basket and brought somewhat distally.  The stone basket was clamped and cut and the ureteroscope was removed and then passed alongside the stone basket up to the engage stone.  The stone was then nicely fragmented  with the holmium laser into multiple small fragments all of which should pass.  The remaining stone basket and ureteroscope was removed.  The guidewire was backloaded through the cystoscope and a 6 [place] 26 cm double-J stent was placed with a good coil in the kidney and a good coil in the bladder.  The string was taped to the phallus.  The patient was awakened and taken to the recovery room in stable condition.  Complications:  None; patient tolerated the procedure well.    Disposition: PACU - hemodynamically stable.  Condition: stable         Additional Details:     Attending Attestation: I performed the procedure.    Arnav Hendrix  Phone Number: [phone]

## 2024-03-23 NOTE — ANESTHESIA PREPROCEDURE EVALUATION
Patient: Jet Pearl    Procedure Information       Date/Time: 03/23/24 0800    Procedure: Cystoscopy with Lithotripsy Laser (Left)    Location: [facility]  / [facility]    Surgeons: Arnav Hendrix MD            Relevant Problems   Anesthesia (within normal limits)      Cardiovascular   (+) Hyperlipidemia   (+) Hypertension      Endocrine   (+) Hyponatremia      GI (within normal limits)      /Renal   (+) Left nephrolithiasis   (+) Renal failure      Neuro/Psych   (+) Anxiety   (+) Depression with anxiety   (+) Reactive depression      Pulmonary   (+) BILLY (obstructive sleep apnea)      GI/Hepatic   (+) Cholelithiasis      Hematology (within normal limits)      Musculoskeletal (within normal limits)      Eyes, Ears, Nose, and Throat (within normal limits)      Infectious Disease   (+) AIDS (CMS/HCC)   (+) Human immunodeficiency virus infection (CMS/HCC)   (+) Influenza with gastrointestinal tract involvement       Clinical information reviewed:   Tobacco  Allergies  Meds   Med Hx  Surg Hx   Fam Hx  Soc Hx      No Known Allergies  Prior to Admission medications    Medication Sig Start Date End Date Taking? Authorizing Provider   elviteg-cob-emtri-tenof ALAFEN (Genvoya) [phone] mg tablet Take by mouth. As directed. 5/30/17   Historical Provider, MD   ibuprofen 800 mg tablet Take 1 tablet (800 mg) by mouth every 8 hours if needed for mild pain (1 - 3).    Historical Provider, MD   latanoprost (Xalatan) 0.005 % ophthalmic solution Administer 1 drop into both eyes once daily at bedtime.    Historical Provider, MD   loperamide (Imodium) 2 mg capsule Take 1 capsule (2 mg) by mouth 4 times a day as needed.    Historical Provider, MD   multivitamin tablet Take 1 tablet by mouth once daily.    Historical Provider, MD   timolol (Timoptic) 0.5 % ophthalmic solution Administer 1 drop into both eyes once daily.    Historical Provider, MD   cholestyramine (Questran) 4 gram packet Take 1 packet (4 g) by mouth  once daily. for 30 day(s)  3/22/24  Historical Provider, MD   codeine 15 mg tablet Take 1 tablet (15 mg) by mouth 4 times a day as needed.  3/22/24  Historical Provider, MD   diphenoxylate-atropine (Lomotil) 2.5-0.025 mg tablet Take 1 tablet by mouth 4 times a day as needed.  3/22/24  Historical Provider, MD   docusate sodium (Colace) 100 mg capsule Take 1 capsule (100 mg) by mouth once daily as needed. for 30 day(s)  3/22/24  Historical Provider, MD   escitalopram (Lexapro) 10 mg tablet Take 1 tablet (10 mg) by mouth once daily. 10/5/21 3/22/24  Historical Provider, MD   ibuprofen 800 mg tablet Take 1 tablet (800 mg) by mouth every 8 hours if needed. with food or milk  3/22/24  Historical Provider, MD   lisinopril 10 mg tablet Take 1 tablet (10 mg) by mouth once daily. 2/10/21 3/22/24  Historical Provider, MD   pantoprazole (ProtoNix) 40 mg EC tablet Take 1 tablet (40 mg) by mouth once daily. for 90 day(s) 7/13/22 3/22/24  Historical Provider, MD     Past Medical History:   Diagnosis Date    Bowel perforation (CMS/HCC) 05/2022    Diabetes mellitus, type 2 (CMS/[facility])     History of syphilis     HIV (human immunodeficiency virus infection) (CMS/[facility])     Hypertension      Past Surgical History:   Procedure Laterality Date    HEMICOLECTOMY Right 05/07/2022    Right hemicolectomy with anastomosis and diverting loop ileostomy, open drainage of pelvic abscess and open cholecystectomy    ILEOSTOMY CLOSURE  10/21/2022    Closure of ileostomy with small bowel resection and anastomosis       NPO Detail:  No data recorded     Physical Exam    Airway  Mallampati: I  TM distance: >3 FB  Neck ROM: full     Cardiovascular    Dental    Pulmonary    Abdominal            Anesthesia Plan    History of general anesthesia?: yes  History of complications of general anesthesia?: no    ASA 3     general     The patient is not a current smoker.  Patient was not previously instructed to abstain from smoking on day of procedure.  Education  provided regarding risk of obstructive sleep apnea.  intravenous induction   Anesthetic plan and risks discussed with patient.    Plan discussed with CAA.

## 2024-03-23 NOTE — ANESTHESIA PROCEDURE NOTES
Airway  Date/Time: 3/23/2024 8:18 AM  Urgency: elective    Airway not difficult    Staffing  Performed: DEBBI   Authorized by: Benigno Alvarado MD    Performed by: DEBBI Galan  Patient location during procedure: OR    Indications and Patient Condition  Indications for airway management: anesthesia  Spontaneous Ventilation: absent  Sedation level: deep  Preoxygenated: yes  Patient position: sniffing  Mask difficulty assessment: 0 - not attempted  Planned trial extubation    Final Airway Details  Final airway type: endotracheal airway      Successful airway: ETT  Cuffed: yes   Successful intubation technique: direct laryngoscopy  Facilitating devices/methods: intubating stylet and cricoid pressure  Endotracheal tube insertion site: oral  Blade: Aleyda  Blade size: #3  ETT size (mm): 7.5  Cormack-Lehane Classification: grade I - full view of glottis  Placement verified by: capnometry   Measured from: lips  ETT to lips (cm): 22  Number of attempts at approach: 1

## 2024-03-23 NOTE — PROGRESS NOTES
Jet Pearl is a 53 y.o. male on day 1 of admission presenting with left flank pain with left hydronephrosis and a left ureter stone.    Subjective   He underwent cystourethroscopy with lithotripsy and stent insertion.  He was still somewhat drowsy, though easily arousable and coherent post procedure.    Objective     Physical Exam  General: awake, oriented, responsive  Cardiovascular: regular, normal S1 and S2  Lungs: good air entry bilaterally, clear to auscultation  Extremities: no peripheral cyanosis, no pedal edema  Neuro: alert, oriented x 3, no focal weakness      Last Recorded Vitals  Blood pressure 134/77, pulse 86, temperature 36.4 °C (97.5 °F), temperature source Oral, resp. rate 16, height 1.829 m (6'), weight 73.8 kg (162 lb 11.2 oz), SpO2 95 %.  Intake/Output last 3 Shifts:  I/O last 3 completed shifts:  In: 3777.9 (51.2 mL/kg) [P.O.:480; I.V.:1247.9 (16.9 mL/kg); IV Piggyback:2050]  Out: 750 (10.2 mL/kg) [Urine:750 (0.3 mL/kg/hr)]  Weight: 73.8 kg     Relevant Results  Lab Results   Component Value Date    WBC 16.4 (H) 03/23/2024    HGB 13.1 (L) 03/23/2024    HCT 39.4 (L) 03/23/2024    MCV 98 03/23/2024     03/23/2024     Lab Results   Component Value Date    GLUCOSE 99 03/23/2024    CALCIUM 8.8 03/23/2024     03/23/2024    K 3.2 (L) 03/23/2024    CO2 20 (L) 03/23/2024     (H) 03/23/2024    BUN 16 03/23/2024    CREATININE 1.60 03/23/2024       Assessment/Plan   Principal Problem:    Left ureteral stone  Active Problems:    BILLY (obstructive sleep apnea)    Left nephrolithiasis    Left hydronephrosis with calculus in the left ureter  S/p cystoscopy, lithtripsy and left ureter stent placement done 3/23     Urinary tract infection  Empiric IV antibiotic therapy with ceftriaxone  Urine cultures in progress.  Blood cultures in progress     Acute kidney injury  May be secondary to volume depletion from diarrhea and also possibly from use of ibuprofen  Improving  Intravenous fluids  Avoid  nephrotoxic medication.     HIV infection  Holding genvoya because of MARTHA     Elevated blood pressure  He has a history of hypertension.  He had taken lisinopril in the past and reports his antihypertensive medication was stopped long time ago and his blood pressures have been normal  IV hydralazine as needed for elevated blood pressure     Hyperglycemia  Resolved. Hemoglobin A1C was 5.0%    Hypokalemia  Replace    Hypophosphatemia  Replace      Ritu Silva MD

## 2024-03-23 NOTE — NURSING NOTE
Patient very pleasant on arrival. Cooperative in care and took all meds as ordered. Patient tolerating clear liquids at this time and aware NPO at midnight for OR in AM. Medicated for pain as requested. VSS. Hopeful for discharge after procedure tomorrow. Call light within reach.

## 2024-03-23 NOTE — ANESTHESIA POSTPROCEDURE EVALUATION
Patient: Jet Pearl    Procedure Summary       Date: 03/23/24 Room / Location: [facility] OR  / [facility]    Anesthesia Start: 0811 Anesthesia Stop: 0906    Procedures:       Cystoscopy with Lithotripsy Laser (Left)      Cystoscopy with Insertion Stent Ureter (Left) Diagnosis:       Left ureteral stone      (Left ureteral stone [N20.1])    Surgeons: Arnav Hendrix MD Responsible Provider: Benigno Alvarado MD    Anesthesia Type: general ASA Status: 3            Anesthesia Type: general    Vitals Value Taken Time   /74 03/23/24 0935   Temp 36.2 °C (97.2 °F) 03/23/24 0900   Pulse 79 03/23/24 0940   Resp 11 03/23/24 0940   SpO2 96 % 03/23/24 0940   Vitals shown include unvalidated device data.    Anesthesia Post Evaluation    Patient location during evaluation: PACU  Patient participation: complete - patient participated  Level of consciousness: awake  Pain score: 0  Pain management: adequate  Multimodal analgesia pain management approach  Airway patency: patent  Two or more strategies used to mitigate risk of obstructive sleep apnea  Cardiovascular status: acceptable  Respiratory status: acceptable  Hydration status: acceptable  Postoperative Nausea and Vomiting: none        There were no known notable events for this encounter.

## 2024-03-23 NOTE — NURSING NOTE
Patient up to bathroom twice. Strained urine and no stone. Patient medicated for pain as he states he can feel the stone moving when he voids. Patient vomited and was medicated with zofran and patient got relief afterward. Provided with warm pack. Call light within reach.

## 2024-03-24 VITALS
SYSTOLIC BLOOD PRESSURE: 153 MMHG | OXYGEN SATURATION: 100 % | BODY MASS INDEX: 22.04 KG/M2 | HEIGHT: 72 IN | RESPIRATION RATE: 16 BRPM | WEIGHT: 162.7 LBS | TEMPERATURE: 97.9 F | HEART RATE: 52 BPM | DIASTOLIC BLOOD PRESSURE: 94 MMHG

## 2024-03-24 LAB
ALBUMIN SERPL-MCNC: 3.2 G/DL (ref 3.5–5)
ANION GAP SERPL CALC-SCNC: 12 MMOL/L
BACTERIA UR CULT: NO GROWTH
BUN SERPL-MCNC: 21 MG/DL (ref 8–25)
CALCIUM SERPL-MCNC: 8.8 MG/DL (ref 8.5–10.4)
CHLORIDE SERPL-SCNC: 108 MMOL/L (ref 97–107)
CO2 SERPL-SCNC: 21 MMOL/L (ref 24–31)
CREAT SERPL-MCNC: 1.3 MG/DL (ref 0.4–1.6)
EGFRCR SERPLBLD CKD-EPI 2021: 65 ML/MIN/1.73M*2
ERYTHROCYTE [DISTWIDTH] IN BLOOD BY AUTOMATED COUNT: 15.1 % (ref 11.5–14.5)
GLUCOSE SERPL-MCNC: 133 MG/DL (ref 65–99)
HCT VFR BLD AUTO: 35.8 % (ref 41–52)
HGB BLD-MCNC: 11.8 G/DL (ref 13.5–17.5)
MCH RBC QN AUTO: 32.2 PG (ref 26–34)
MCHC RBC AUTO-ENTMCNC: 33 G/DL (ref 32–36)
MCV RBC AUTO: 98 FL (ref 80–100)
NRBC BLD-RTO: 0 /100 WBCS (ref 0–0)
PHOSPHATE SERPL-MCNC: 2.9 MG/DL (ref 2.5–4.5)
PLATELET # BLD AUTO: 188 X10*3/UL (ref 150–450)
POTASSIUM SERPL-SCNC: 3.8 MMOL/L (ref 3.4–5.1)
RBC # BLD AUTO: 3.66 X10*6/UL (ref 4.5–5.9)
SODIUM SERPL-SCNC: 141 MMOL/L (ref 133–145)
WBC # BLD AUTO: 16.8 X10*3/UL (ref 4.4–11.3)

## 2024-03-24 PROCEDURE — 2500000004 HC RX 250 GENERAL PHARMACY W/ HCPCS (ALT 636 FOR OP/ED): Performed by: UROLOGY

## 2024-03-24 PROCEDURE — 2500000002 HC RX 250 W HCPCS SELF ADMINISTERED DRUGS (ALT 637 FOR MEDICARE OP, ALT 636 FOR OP/ED): Performed by: UROLOGY

## 2024-03-24 PROCEDURE — 84100 ASSAY OF PHOSPHORUS: CPT | Performed by: INTERNAL MEDICINE

## 2024-03-24 PROCEDURE — 85027 COMPLETE CBC AUTOMATED: CPT | Performed by: INTERNAL MEDICINE

## 2024-03-24 PROCEDURE — 36415 COLL VENOUS BLD VENIPUNCTURE: CPT | Performed by: INTERNAL MEDICINE

## 2024-03-24 PROCEDURE — 2500000001 HC RX 250 WO HCPCS SELF ADMINISTERED DRUGS (ALT 637 FOR MEDICARE OP): Performed by: UROLOGY

## 2024-03-24 RX ORDER — CEFUROXIME AXETIL 250 MG/1
250 TABLET ORAL 2 TIMES DAILY
Qty: 10 TABLET | Refills: 0 | Status: SHIPPED | OUTPATIENT
Start: 2024-03-24

## 2024-03-24 RX ORDER — OXYCODONE HYDROCHLORIDE 5 MG/1
5 TABLET ORAL EVERY 8 HOURS PRN
Qty: 10 TABLET | Refills: 0 | Status: SHIPPED | OUTPATIENT
Start: 2024-03-24

## 2024-03-24 RX ORDER — CEFUROXIME AXETIL 500 MG/1
500 TABLET ORAL 2 TIMES DAILY
Start: 2024-03-24

## 2024-03-24 RX ADMIN — HEPARIN SODIUM 5000 UNITS: 5000 INJECTION, SOLUTION INTRAVENOUS; SUBCUTANEOUS at 04:26

## 2024-03-24 RX ADMIN — TAMSULOSIN HYDROCHLORIDE 0.4 MG: 0.4 CAPSULE ORAL at 10:03

## 2024-03-24 RX ADMIN — TIMOLOL MALEATE 1 DROP: 5 SOLUTION OPHTHALMIC at 10:03

## 2024-03-24 RX ADMIN — SODIUM CHLORIDE 125 ML/HR: 900 INJECTION, SOLUTION INTRAVENOUS at 04:26

## 2024-03-24 RX ADMIN — MULTIVITAMIN TABLET 1 TABLET: TABLET at 10:03

## 2024-03-24 ASSESSMENT — COGNITIVE AND FUNCTIONAL STATUS - GENERAL: DAILY ACTIVITIY SCORE: 24

## 2024-03-24 ASSESSMENT — PAIN - FUNCTIONAL ASSESSMENT
PAIN_FUNCTIONAL_ASSESSMENT: 0-10
PAIN_FUNCTIONAL_ASSESSMENT: 0-10

## 2024-03-24 ASSESSMENT — PAIN SCALES - GENERAL: PAINLEVEL_OUTOF10: 0 - NO PAIN

## 2024-03-24 NOTE — NURSING NOTE
Patient states he is feeling much better than yesterday. Still with reddish urine. Straining all urine. No stone found yet. Patient aware we may not find stone. No pain noted. Patient up walking around. Hopeful to go home tomorrow afternoon. IVF running. Call light within reach.

## 2024-03-24 NOTE — DISCHARGE SUMMARY
Discharge Diagnosis  Left ureteral stone  Left hydronephrosis  Acute kidney injury  Urinary tract infection    Issues Requiring Follow-Up  Outpatient follow-up with urologist    Discharge Meds     Your medication list        START taking these medications        Instructions Last Dose Given Next Dose Due   cefuroxime 250 mg tablet  Commonly known as: Ceftin  Notes to patient: Disregard this dosage/prescription per provider Dr. Silva.      Take 1 tablet (250 mg) by mouth 2 times a day.       cefuroxime 500 mg tablet  Commonly known as: Ceftin      Take 1 tablet (500 mg) by mouth 2 times a day.       oxyCODONE 5 mg immediate release tablet  Commonly known as: Roxicodone      Take 1 tablet (5 mg) by mouth every 8 hours if needed for severe pain (7 - 10).              CONTINUE taking these medications        Instructions Last Dose Given Next Dose Due   Genvoya [phone] mg tablet  Generic drug: elviteg-cob-emtri-tenof ALAFEN           latanoprost 0.005 % ophthalmic solution  Commonly known as: Xalatan           loperamide 2 mg capsule  Commonly known as: Imodium           multivitamin tablet           timolol 0.5 % ophthalmic solution  Commonly known as: Timoptic                     Where to Get Your Medications        These medications were sent to [id number] [address] [address]      Phone: [phone]   cefuroxime 250 mg tablet  oxyCODONE 5 mg immediate release tablet       Information about where to get these medications is not yet available    Ask your nurse or doctor about these medications  cefuroxime 500 mg tablet         Test Results Pending At Discharge  Pending Labs       Order Current Status    Blood Culture Preliminary result    Blood Culture Preliminary result            Hospital Course   83-year-old male patient presented to the [facility] emergency department with left flank pain which  began 3 days earlier.  He also reported chills and sweating episodes.  There was nausea and vomiting.  He has been taking ibuprofen intermittently for the pain.  His urinalysis was abnormal and suggestive of urinary tract infection.  A CT scan of the abdomen and pelvis showed moderate left hydronephrosis to the level of a 5 mm calculus within the proximal left ureter with moderate left perinephric stranding.  Admitted to the floor and seen by the urology service.  He underwent cystoscopy and left ureteral stent placement on 3/23.  He clinically improved.  Urine and blood cultures were negative at the time of discharge.  Kidney function had improved and creatinine was down from 2.0 at admission to 1.3 at discharge.  Medically stable for discharge.  He was discharged on a 5-day course of cefuroxime.  He had had slight blood pressure elevation.  He is asked to follow-up with his primary physician to have his blood pressure rechecked.  He is also asked to follow-up with Dr. Hendrix, urologist, in the office.   The time spent arranging and coordinating discharge was 35 minutes      Outpatient Follow-Up  Future Appointments   Date Time Provider Department Center   4/15/2024  8:30 AM ELVIS Luong-CNP [id number]         Ritu Silva MD

## 2024-03-24 NOTE — CARE PLAN
The patient's goals for the shift include  paincontrol    The clinical goals for the shift include pain control

## 2024-03-24 NOTE — DOCUMENTATION CLARIFICATION NOTE
"    PATIENT:               ELENI THRASHER  ACCT #:                  [id number]  MRN:                       [id number]  :                       1970  ADMIT DATE:       3/22/2024 2:48 PM  DISCH DATE:        3/24/2024 2:17 PM  RESPONDING PROVIDER #:        [id number]          PROVIDER RESPONSE TEXT:    UTI still ruled in and treated for the 3/22 admission despite negative urine cultures    CDI QUERY TEXT:    [facility]_Diagnosis Ruled Out In    Instruction:    Based on your assessment of the patient and the clinical information, please provide the requested documentation by clicking on the appropriate radio button and enter any additional information if prompted.    Question: Please further clarify the diagnosis of Urinary tract infection as      When answering this query, please exercise your independent professional judgment. The fact that a question is being asked, does not imply that any particular answer is desired or expected.    The patient's clinical indicators include:  Clinical Information:  53 y M  presented with left flank pain, left hydronephrosis and a left ureter stone. He underwent cystourethroscopy with lithotripsy and stent insertion on 3/23.    Clinical Indicators:  3/22 164 Urinalysis positive for ketones, blood, glucose, protein, RBC, WBC  3/22 1812 H&P \"Urinary tract infection Empiric IV antibiotic therapy with ceftriaxone Urine cultures in progress\"  3/22 164 Urine Culture finalized 3/24 0739 \"No growth\"  3/24 1035 Internal Med \"Urinary tract infection Empiric IV antibiotic therapy with ceftriaxone Urine cultures negative to date\"    Treatment:  3/22 1500 NaCl 1L IV fluid bolus  3/22 1614 NaCl 1L IV fluid bolus  Rocephin 1g IV Q24h given 3/22 to 3/23 x 2 doses    Risk Factors:  Left Hydronephrosis, L ureteral stone, MARTHA may be 2/2 volume depletion from diarrhea, Personal history of other infectious diseases  Options provided:  -- UTI was ruled out for the 3/22 stay after study  -- UTI still ruled in " and treated for the 3/22 admission despite negative urine cultures  -- Other - I will add my own diagnosis  -- Refer to Clinical Documentation Reviewer    Query created by: Melissa Galindo on 3/24/2024 1:33 PM      Electronically signed by:  NICK HANLEY MD 3/24/2024 2:43 PM

## 2024-03-24 NOTE — PROGRESS NOTES
Jet Pearl is a 53 y.o. male on day 2 of admission presenting with left flank pain with left hydronephrosis and a left ureter stone.    Subjective   He underwent cystourethroscopy with lithotripsy and stent insertion on 3/23.   He was awake and alert, no acute complaints.   His blood pressure today was 147/88    Objective     Physical Exam  General: awake, oriented, responsive  Cardiovascular: regular, normal S1 and S2  Lungs: good air entry bilaterally, clear to auscultation  Extremities: no peripheral cyanosis, no pedal edema  Neuro: alert, oriented x 3, no focal weakness      Last Recorded Vitals  Blood pressure 147/88, pulse 78, temperature 36.6 °C (97.9 °F), temperature source Oral, resp. rate 16, height 1.829 m (6'), weight 73.8 kg (162 lb 11.2 oz), SpO2 96 %.  Intake/Output last 3 Shifts:  I/O last 3 completed shifts:  In: 5387.9 (73 mL/kg) [P.O.:840; I.V.:1247.9 (16.9 mL/kg); IV Piggyback:3300]  Out: 1800 (24.4 mL/kg) [Urine:1800 (0.7 mL/kg/hr)]  Weight: 73.8 kg     Relevant Results  Lab Results   Component Value Date    WBC 16.8 (H) 03/24/2024    HGB 11.8 (L) 03/24/2024    HCT 35.8 (L) 03/24/2024    MCV 98 03/24/2024     03/24/2024     Lab Results   Component Value Date    GLUCOSE 133 (H) 03/24/2024    CALCIUM 8.8 03/24/2024     03/24/2024    K 3.8 03/24/2024    CO2 21 (L) 03/24/2024     (H) 03/24/2024    BUN 21 03/24/2024    CREATININE 1.30 03/24/2024       Assessment/Plan   Principal Problem:    Left ureteral stone  Active Problems:    BILLY (obstructive sleep apnea)    Left nephrolithiasis    Left hydronephrosis with calculus in the left ureter  S/p cystoscopy, lithtripsy and left ureter stent placement done 3/23     Urinary tract infection  Empiric IV antibiotic therapy with ceftriaxone  Urine cultures negative to date.  Blood cultures are negative to date     Acute kidney injury  May be secondary to volume depletion from diarrhea and also possibly from use of ibuprofen  Continues to  improve  Intravenous fluids  Avoid nephrotoxic medication.     HIV infection  Restart Genvoya after discharge     Elevated blood pressure  He has a history of hypertension.  He had taken lisinopril in the past and reports his antihypertensive medication was stopped long time ago and his blood pressures have been normal  Blood pressure improved, still slightly elevated.  Recommend follow-up with his primary care physician to have his blood pressure rechecked     hyperglycemia  Resolved. Hemoglobin A1C was 5.0%    Hypokalemia  Resolved    Hypophosphatemia  Resolved    Discharge home today.  Complete oral antibiotics.  Follow-up as outpatient with urologist, Dr. Hendrix and primary physician      Ritu Silva MD

## 2024-03-24 NOTE — DISCHARGE INSTRUCTIONS
Avoid ibuprofen and other NSAIDs such as naproxen (aleve) because the kidneys are still recovering from acute injury. It is okay to take tylenol. May use oxycodone as needed for pain if there is uncontrolled pain. Inform your primary provider if there is fever or increasing pain.  Your pharmacy will dispense the cefuroxime dose of 500 mg, available today.  Please disregard the 250 mg dose.  Follow up with Dr Almendarez to have your blood pressure rechecked.

## 2024-03-24 NOTE — PROGRESS NOTES
Jet Pearl is a 53 y.o. male on day 2 of admission presenting with Left ureteral stone.    Subjective   He feels well today.  Mild stent discomfort.  BMP is unremarkable.   Has an elevated white blood cell count of 16.8.    Objective     Physical Exam  Awake alert and oriented  Lungs: Normal respiratory pattern.    Last Recorded Vitals  Blood pressure (!) 153/94, pulse 52, temperature 36.6 °C (97.9 °F), temperature source Oral, resp. rate 16, height 1.829 m (6'), weight 73.8 kg (162 lb 11.2 oz), SpO2 100 %.  Intake/Output last 3 Shifts:  I/O last 3 completed shifts:  In: 5387.9 (73 mL/kg) [P.O.:840; I.V.:1247.9 (16.9 mL/kg); IV Piggyback:3300]  Out: 1800 (24.4 mL/kg) [Urine:1800 (0.7 mL/kg/hr)]  Weight: 73.8 kg     Relevant Results  Scheduled medications  cefTRIAXone, 1 g, intravenous, q24h  heparin (porcine), 5,000 Units, subcutaneous, q8h MIHIR  latanoprost, 1 drop, Both Eyes, Nightly  multivitamin, 1 tablet, oral, Daily  tamsulosin, 0.4 mg, oral, Daily  timolol, 1 drop, Both Eyes, Daily      Continuous medications  sodium chloride 0.9%, 125 mL/hr, Last Rate: 125 mL/hr (03/24/24 0426)      PRN medications  PRN medications: acetaminophen **OR** acetaminophen **OR** acetaminophen, melatonin, morphine, morphine, ondansetron **OR** ondansetron, polyethylene glycol    Results for orders placed or performed during the hospital encounter of 03/22/24 (from the past 24 hour(s))   CBC   Result Value Ref Range    WBC 16.8 (H) 4.4 - 11.3 x10*3/uL    nRBC 0.0 0.0 - 0.0 /100 WBCs    RBC 3.66 (L) 4.50 - 5.90 x10*6/uL    Hemoglobin 11.8 (L) 13.5 - 17.5 g/dL    Hematocrit 35.8 (L) 41.0 - 52.0 %    MCV 98 80 - 100 fL    MCH 32.2 26.0 - 34.0 pg    MCHC 33.0 32.0 - 36.0 g/dL    RDW 15.1 (H) 11.5 - 14.5 %    Platelets 188 150 - 450 x10*3/uL   Renal Function Panel   Result Value Ref Range    Glucose 133 (H) 65 - 99 mg/dL    Sodium 141 133 - 145 mmol/L    Potassium 3.8 3.4 - 5.1 mmol/L    Chloride 108 (H) 97 - 107 mmol/L    Bicarbonate  21 (L) 24 - 31 mmol/L    Urea Nitrogen 21 8 - 25 mg/dL    Creatinine 1.30 0.40 - 1.60 mg/dL    eGFR 65 >60 mL/min/1.73m*2    Calcium 8.8 8.5 - 10.4 mg/dL    Phosphorus 2.9 2.5 - 4.5 mg/dL    Albumin 3.2 (L) 3.5 - 5.0 g/dL    Anion Gap 12 <=19 mmol/L       FL less than 1 hour    Result Date: 3/23/2024  Interpreted By:  Iker Albrecht, STUDY: FL LESS THAN 1 HOUR; 3/23/2024 9:02 am   INDICATION: Signs/Symptoms:left ureteral stone   COMPARISON: None.   ACCESSION NUMBER(S): [id number]   ORDERING CLINICIAN: AIRAM IZAGUIRRE   TECHNIQUE: A total of 4 spot images were provided for review of an intraoperative cholangiogram. Fluoroscopy time provided was  10.2 seconds.   FINDINGS: Left nephroureteral stent noted. Mild degenerative lumbosacral spine changes.       1. Limited fluoroscopic images showing left nephroureteric stent. Distal end of stent is not visualized. Advise correlation with real-time fluoroscopy   Signed by: Iker Albrecht 3/23/2024 9:13 AM Dictation workstation:   [id number]    CT abdomen pelvis wo IV contrast    Result Date: 3/22/2024  Interpreted By:  Lars Ramos, STUDY: CT ABDOMEN PELVIS WO IV CONTRAST; 3/22/2024 3:59 pm   INDICATION: Signs/Symptoms:abdominal pain;   COMPARISON: 06/07/2022   ACCESSION NUMBER(S): [id number]   ORDERING CLINICIAN: STANISLAV JAIN   TECHNIQUE: Contiguous axial images from the lung bases through the abdomen and pelvis were performed. Additionally, coronal and sagittal reconstructed images were obtained. All CT examinations are performed with 1 or more of the following dose reduction techniques: Automated exposure control, adjustment of mA and/or kv according to patient's size, or use of iterative reconstruction techniques.     FINDINGS: Evaluation of the solid viscera is suboptimal due to the lack of intravenous contrast.   Limited images of the lower thorax: No pleural or pericardial effusion.   The liver, common bile duct, pancreas, spleen, and adrenal glands are  unremarkable. Prior cholecystectomy with surgical clips in the gallbladder fossa again noted.   Kidneys: There is moderate left hydro nephrosis to the level of a 6 x 5 mm calculus within the proximal left ureter. There is prominent left perinephric inflammatory stranding. No other calculi are seen within the kidneys bilaterally. No right hydronephrosis.   Urinary bladder: Normally distended and otherwise unremarkable.   The visualized aorta is unremarkable.   The bowel is nondistended without evidence for acute pathology. No acute inflammatory changes of the colon.   No ascites or free intraperitoneal air within the abdomen or pelvis is seen.   The visualized osseous structures are intact.         Moderate left hydro nephrosis to the level of a 6 x 5 mm calculus within the proximal left ureter. There is prominent left perinephric inflammatory stranding.   Signed by: Lars Ramos 3/22/2024 4:24 PM Dictation workstation:   [id number]                             Assessment/Plan   Principal Problem:    Left ureteral stone  Active Problems:    BILLY (obstructive sleep apnea)    Left nephrolithiasis    Stone removed yesterday ureteroscopically.  He has a stent in place.  He knows to remove the stent by pulling the string tomorrow.  White blood cell count is elevated, however, the urine culture is negative.  As long as he is afebrile and not otherwise symptomatic I do not think he needs antibiotics.           Arnav Hendrix MD

## 2024-03-27 LAB
BACTERIA BLD CULT: NORMAL
BACTERIA BLD CULT: NORMAL

## 2024-04-05 DIAGNOSIS — B20 HUMAN IMMUNODEFICIENCY VIRUS (MULTI): Primary | ICD-10-CM

## 2024-04-05 RX ORDER — ELVITEGRAVIR, COBICISTAT, EMTRICITABINE, AND TENOFOVIR ALAFENAMIDE 150; 150; 200; 10 MG/1; MG/1; MG/1; MG/1
TABLET ORAL
Qty: 30 TABLET | Refills: 5 | Status: SHIPPED | OUTPATIENT
Start: 2024-04-05

## 2024-04-08 ENCOUNTER — APPOINTMENT (OUTPATIENT)
Dept: IMMUNOLOGY | Facility: CLINIC | Age: 54
End: 2024-04-08
Payer: MEDICAID

## 2024-04-15 ENCOUNTER — OFFICE VISIT (OUTPATIENT)
Dept: IMMUNOLOGY | Facility: CLINIC | Age: 54
End: 2024-04-15
Payer: MEDICAID

## 2024-04-15 VITALS
HEIGHT: 72 IN | OXYGEN SATURATION: 100 % | SYSTOLIC BLOOD PRESSURE: 155 MMHG | RESPIRATION RATE: 16 BRPM | BODY MASS INDEX: 21.51 KG/M2 | HEART RATE: 72 BPM | DIASTOLIC BLOOD PRESSURE: 94 MMHG | TEMPERATURE: 98.1 F | WEIGHT: 158.8 LBS

## 2024-04-15 DIAGNOSIS — I15.9 SECONDARY HYPERTENSION: ICD-10-CM

## 2024-04-15 DIAGNOSIS — B20 HIV INFECTION, UNSPECIFIED SYMPTOM STATUS (MULTI): ICD-10-CM

## 2024-04-15 LAB
ALBUMIN SERPL BCP-MCNC: 3.6 G/DL (ref 3.4–5)
ALP SERPL-CCNC: 98 U/L (ref 33–120)
ALT SERPL W P-5'-P-CCNC: 13 U/L (ref 10–52)
ANION GAP SERPL CALC-SCNC: 12 MMOL/L (ref 10–20)
AST SERPL W P-5'-P-CCNC: 15 U/L (ref 9–39)
BILIRUB SERPL-MCNC: 0.4 MG/DL (ref 0–1.2)
BUN SERPL-MCNC: 14 MG/DL (ref 6–23)
CALCIUM SERPL-MCNC: 9.1 MG/DL (ref 8.6–10.6)
CHLORIDE SERPL-SCNC: 105 MMOL/L (ref 98–107)
CO2 SERPL-SCNC: 31 MMOL/L (ref 21–32)
CREAT SERPL-MCNC: 1.28 MG/DL (ref 0.5–1.3)
EGFRCR SERPLBLD CKD-EPI 2021: 66 ML/MIN/1.73M*2
GLUCOSE SERPL-MCNC: 62 MG/DL (ref 74–99)
POTASSIUM SERPL-SCNC: 4.5 MMOL/L (ref 3.5–5.3)
PROT SERPL-MCNC: 6.6 G/DL (ref 6.4–8.2)
SODIUM SERPL-SCNC: 143 MMOL/L (ref 136–145)

## 2024-04-15 PROCEDURE — 36415 COLL VENOUS BLD VENIPUNCTURE: CPT | Performed by: NURSE PRACTITIONER

## 2024-04-15 PROCEDURE — 99215 OFFICE O/P EST HI 40 MIN: CPT | Performed by: NURSE PRACTITIONER

## 2024-04-15 PROCEDURE — 80053 COMPREHEN METABOLIC PANEL: CPT | Performed by: NURSE PRACTITIONER

## 2024-04-15 PROCEDURE — 85025 COMPLETE CBC W/AUTO DIFF WBC: CPT | Performed by: NURSE PRACTITIONER

## 2024-04-15 PROCEDURE — 1036F TOBACCO NON-USER: CPT | Performed by: NURSE PRACTITIONER

## 2024-04-15 PROCEDURE — 3044F HG A1C LEVEL LT 7.0%: CPT | Performed by: NURSE PRACTITIONER

## 2024-04-15 PROCEDURE — 3060F POS MICROALBUMINURIA REV: CPT | Performed by: NURSE PRACTITIONER

## 2024-04-15 PROCEDURE — 3077F SYST BP >= 140 MM HG: CPT | Performed by: NURSE PRACTITIONER

## 2024-04-15 PROCEDURE — 87536 HIV-1 QUANT&REVRSE TRNSCRPJ: CPT | Performed by: NURSE PRACTITIONER

## 2024-04-15 PROCEDURE — 3080F DIAST BP >= 90 MM HG: CPT | Performed by: NURSE PRACTITIONER

## 2024-04-15 PROCEDURE — 88185 FLOWCYTOMETRY/TC ADD-ON: CPT | Mod: TC | Performed by: NURSE PRACTITIONER

## 2024-04-15 RX ORDER — NEBULIZER AND COMPRESSOR
EACH MISCELLANEOUS
Qty: 1 EACH | Refills: 0 | Status: SHIPPED | OUTPATIENT
Start: 2024-04-15

## 2024-04-15 RX ORDER — DORZOLAMIDE HYDROCHLORIDE AND TIMOLOL MALEATE 20; 5 MG/ML; MG/ML
SOLUTION/ DROPS OPHTHALMIC
COMMUNITY
Start: 2024-04-10

## 2024-04-15 ASSESSMENT — ENCOUNTER SYMPTOMS
CONSTITUTIONAL NEGATIVE: 1
EYES NEGATIVE: 1
DIZZINESS: 1
CARDIOVASCULAR NEGATIVE: 1
HEMATOLOGIC/LYMPHATIC NEGATIVE: 1
ENDOCRINE NEGATIVE: 1
ALLERGIC/IMMUNOLOGIC NEGATIVE: 1
RESPIRATORY NEGATIVE: 1

## 2024-04-15 ASSESSMENT — PAIN SCALES - GENERAL: PAINLEVEL: 0-NO PAIN

## 2024-04-15 NOTE — PROGRESS NOTES
HIV Clinic Follow-up Visit:    Jet Pearl was last seen in [facility] on Visit 10/3/2023.    Missed antiretroviral doses in last 72 hours? No    Sexually active? yes, Partner/s aware of diagnosis? yes,     Condom use? Never,   Currently in a monogamous relationship.    Tobacco use: No.   Does not use alcohol.  Does use marijuana - smokes it - gets from a dispensary.    SUBJECTIVE: HIV+ patient recently released from hospital where he was treated for a ureteral stone - stent was placed.  Then he had an infected tooth and is finally feeling physically.   Back at work.    Review of Systems  Review of Systems   Constitutional: Negative.    HENT: Negative.     Eyes: Negative.    Respiratory: Negative.     Cardiovascular: Negative.    Gastrointestinal:         He has history of a colon resection - so his stools are almost always soft - not diarrhea.   He does use fiber.   Will occasionally have diarrhea but he feels that depends on what he eats.    Endocrine: Negative.    Genitourinary:         Recently hospitalized with a ureteral stone - stent was placed.   Feeling much better now and is following with urology.    Musculoskeletal:         Just regular aches and pains - he stands a lot at work and maybe his posture isn't the best.  Does a lot of repetitive motions at work as well.     Skin: Negative.    Allergic/Immunologic: Negative.    Neurological:  Positive for dizziness.        Told RN he is occasionally dizzy - may be from .    Hematological: Negative.    Psychiatric/Behavioral:          He is struggling with relationship issues (and has been for years).  They are starting couples counseling,   Things have to change so Jet can make his own health a priority.        CURRENT MEDICATIONS:    Current Outpatient Medications:     dorzolamide-timoloL (Cosopt) 22.3-6.8 mg/mL ophthalmic solution, INSTILL 1 DROP IN LEFT EYE TWICE DAILY, Disp: , Rfl:     elviteg-cob-emtri-tenof ALAFEN (Genvoya) [phone] mg tablet, TAKE  1 TABLET BY MOUTH ONCE DAILY WITH FOOD. STORE IN ORIGINAL CONTAINER AT ROOM TEMPERATURE, Disp: 30 tablet, Rfl: 5    latanoprost (Xalatan) 0.005 % ophthalmic solution, Administer 1 drop into both eyes once daily at bedtime., Disp: , Rfl:     loperamide (Imodium) 2 mg capsule, Take 1 capsule (2 mg) by mouth 4 times a day as needed., Disp: , Rfl:     multivitamin tablet, Take 1 tablet by mouth once daily., Disp: , Rfl:     multivitamin with minerals (MULTIPLE VITAMIN-MINERALS ORAL), as directed Orally, Disp: , Rfl:     timolol (Timoptic) 0.5 % ophthalmic solution, Administer 1 drop into both eyes once daily., Disp: , Rfl:     cefuroxime (Ceftin) 250 mg tablet, Take 1 tablet (250 mg) by mouth 2 times a day. (Patient not taking: Reported on 4/15/2024), Disp: 10 tablet, Rfl: 0    cefuroxime (Ceftin) 500 mg tablet, Take 1 tablet (500 mg) by mouth 2 times a day. (Patient not taking: Reported on 4/15/2024), Disp: , Rfl:     oxyCODONE (Roxicodone) 5 mg immediate release tablet, Take 1 tablet (5 mg) by mouth every 8 hours if needed for severe pain (7 - 10). (Patient not taking: Reported on 4/15/2024), Disp: 10 tablet, Rfl: 0    PHYSICAL EXAMINATION:  Visit Vitals  BP (!) 155/94 (BP Location: Left arm, Patient Position: Lying, BP Cuff Size: Adult)   Pulse 72   Temp 36.7 °C (98.1 °F) (Temporal)   Resp 16   Ht 1.829 m (6')   Wt 72 kg (158 lb 12.8 oz)   SpO2 100%   BMI 21.54 kg/m²   Smoking Status Never   BSA 1.91 m²       Physical Exam   Physical Exam  Vitals reviewed.   Constitutional:       Appearance: Normal appearance.      Comments: He looks well - even with all that he has been through.    HENT:      Head: Normocephalic.   Cardiovascular:      Rate and Rhythm: Normal rate and regular rhythm.      Heart sounds: Normal heart sounds.   Pulmonary:      Effort: Pulmonary effort is normal.      Breath sounds: Normal breath sounds.   Abdominal:      General: Bowel sounds are normal.      Palpations: Abdomen is soft.    Musculoskeletal:         General: Normal range of motion.      Cervical back: Neck supple.   Skin:     General: Skin is warm and dry.   Neurological:      Mental Status: He is alert and oriented to person, place, and time.   Psychiatric:         Mood and Affect: Mood normal.      Comments: He is attending to his own mental health.          PERTINENT DATA:  CBC:  WBC   Date Value Ref Range Status   03/24/2024 16.8 (H) 4.4 - 11.3 x10*3/uL Final     nRBC   Date Value Ref Range Status   03/24/2024 0.0 0.0 - 0.0 /100 WBCs Final     RBC   Date Value Ref Range Status   03/24/2024 3.66 (L) 4.50 - 5.90 x10*6/uL Final     Hemoglobin   Date Value Ref Range Status   03/24/2024 11.8 (L) 13.5 - 17.5 g/dL Final     MCV   Date Value Ref Range Status   03/24/2024 98 80 - 100 fL Final     RDW   Date Value Ref Range Status   03/24/2024 15.1 (H) 11.5 - 14.5 % Final       Renal Function Panel:  Glucose   Date Value Ref Range Status   03/24/2024 133 (H) 65 - 99 mg/dL Final     Sodium   Date Value Ref Range Status   03/24/2024 141 133 - 145 mmol/L Final     Potassium   Date Value Ref Range Status   03/24/2024 3.8 3.4 - 5.1 mmol/L Final     Chloride   Date Value Ref Range Status   03/24/2024 108 (H) 97 - 107 mmol/L Final     HCO3, Arterial   Date Value Ref Range Status   05/09/2022 33.3 (H) 22 - 28 MMOL/L Final     Anion Gap   Date Value Ref Range Status   03/24/2024 12 <=19 mmol/L Final     Urea Nitrogen   Date Value Ref Range Status   03/24/2024 21 8 - 25 mg/dL Final     Creatinine   Date Value Ref Range Status   03/24/2024 1.30 0.40 - 1.60 mg/dL Final     eGFR   Date Value Ref Range Status   03/24/2024 65 >60 mL/min/1.73m*2 Final     Comment:     Calculations of estimated GFR are performed using the 2021 CKD-EPI Study Refit equation without the race variable for the IDMS-Traceable creatinine methods.  https://jasn.asnjournals.org/content/early/2021/09/22/ASN.1266814222     Calcium   Date Value Ref Range Status   03/24/2024 8.8 8.5 -  10.4 mg/dL Final     Phosphorus   Date Value Ref Range Status   03/24/2024 2.9 2.5 - 4.5 mg/dL Final     Albumin   Date Value Ref Range Status   03/24/2024 3.2 (L) 3.5 - 5.0 g/dL Final       Hepatic Panel:  Albumin   Date Value Ref Range Status   03/24/2024 3.2 (L) 3.5 - 5.0 g/dL Final     Bilirubin, Total   Date Value Ref Range Status   03/22/2024 0.8 0.1 - 1.2 mg/dL Final     Bilirubin, Direct   Date Value Ref Range Status   07/01/2022 0.3 (H) 0.0 - 0.2 MG/DL Final     Alkaline Phosphatase   Date Value Ref Range Status   03/22/2024 128 (H) 35 - 125 U/L Final     ALT   Date Value Ref Range Status   03/22/2024 17 5 - 40 U/L Final       HIV Viral Load:  HIV-1 RNA PCR Viral Load Log   Date Value Ref Range Status   10/03/2023   Final     Comment:     Not calculated     HIV RNA Result   Date Value Ref Range Status   10/03/2023 Not Detected Not Detected Corrected     Comment:     Result has been updated to reportable.       CD4 Count:  CD3+CD4+%   Date Value Ref Range Status   10/03/2023 28 (L) 29 - 57 % Final     CD3+CD4+ Absolute   Date Value Ref Range Status   10/03/2023 0.599 0.350 - 2.740 x10E9/L Final     CD3+CD8+%   Date Value Ref Range Status   10/03/2023 52 (H) 7 - 31 % Final     CD3+CD8+ Absolute   Date Value Ref Range Status   10/03/2023 1.113 0.080 - 1.490 x10E9/L Final     CD4/CD8 Ratio   Date Value Ref Range Status   10/03/2023 0.54 (L) 1.00 - 2.60 Final     CD45%   Date Value Ref Range Status   04/04/2023 100 % Final       CRCL:  Creatinine, Urine Random   Date Value Ref Range Status   03/22/2024 172.9 NOT ESTABLISHED mg/dL Final       Lipid Panel:  HDL   Date Value Ref Range Status   04/04/2023 44.1 mg/dL Final     Comment:     .      AGE      VERY LOW   LOW     NORMAL    HIGH       0-19 Y       < 35   < 40     40-45     ----    20-24 Y       ----   < 40       >45     ----      >24 Y       ----   < 40     40-60      >60  .       Cholesterol/HDL Ratio   Date Value Ref Range Status   04/04/2023 2.8  Final      Comment:     REF VALUES  DESIRABLE  < 3.4  HIGH RISK  > 5.0       LDL   Date Value Ref Range Status   04/04/2023 55 0 - 99 mg/dL Final     Comment:     .                           NEAR      BORD      AGE      DESIRABLE  OPTIMAL    HIGH     HIGH     VERY HIGH     0-19 Y     0 - 109     ---    110-129   >/= 130     ----    20-24 Y     0 - 119     ---    120-159   >/= 160     ----      >24 Y     0 -  99   100-129  130-159   160-189     >/=190  .       VLDL   Date Value Ref Range Status   04/04/2023 24 0 - 40 mg/dL Final     Triglycerides   Date Value Ref Range Status   04/04/2023 120 0 - 149 mg/dL Final     Comment:     .      AGE      DESIRABLE   BORDERLINE HIGH   HIGH     VERY HIGH   0 D-90 D    19 - 174         ----         ----        ----  91 D- 9 Y     0 -  74        75 -  99     >/= 100      ----    10-19 Y     0 -  89        90 - 129     >/= 130      ----    20-24 Y     0 - 114       115 - 149     >/= 150      ----         >24 Y     0 - 149       150 - 199    200- 499    >/= 500  .   Venipuncture immediately after or during the    administration of Metamizole may lead to falsely   low results. Testing should be performed immediately   prior to Metamizole dosing.         HgbA1c:  Hemoglobin A1C   Date Value Ref Range Status   03/23/2024 5.0 See below % Final       The ASCVD Risk score (Loan DK, et al., 2019) failed to calculate for the following reasons:    The valid total cholesterol range is 130 to 320 mg/dL    ASSESSMENT / PLAN:  We are getting routine labs today.   We will order a blood pressure cuff - he will take his BP at home, keep a log, and we will connect in a couple of weeks to see what these readings are. Was high here in the office today; and was high during hospital stay.  We did discuss diet and exercise - he hasn't walked as much as he used to and he isn't doing yoga regularly - he does plan to restart both of those things.     Problem List Items Addressed This Visit       Human  immunodeficiency virus infection (Multi)    Relevant Orders    CBC and Auto Differential    CD4/8 Panel    Comprehensive Metabolic Panel    HIV RNA, quantitative, PCR   Thank you for coming in to see us today.  I want to see you back in about 4 months.   We have ordered a BP cuff.   Please take your BP once a day everyday at about the same time - we will talk in a couple of weeks to discuss whether we need to add medication to your regimen.     Alma Dinero, APRN-CNP

## 2024-04-15 NOTE — LETTER
04/15/24    Austin Almendarez DO  [address]  [address]  [facility] [address]      Dear Dr. Austin Almendarez DO,    I am writing to confirm that your patient, Jet Pearl, received care in my office on 04/15/24. I have enclosed a summary of the care provided to Jet for your reference.    Please contact me with any questions you may have regarding the visit.    Sincerely,         Alma Dinero, APRN-CNP  [address]  [facility] [address]  [facility] [address] [phone]    CC: No Recipients

## 2024-04-16 ENCOUNTER — HOSPITAL ENCOUNTER (OUTPATIENT)
Dept: RADIOLOGY | Facility: CLINIC | Age: 54
Discharge: HOME | End: 2024-04-16
Payer: MEDICAID

## 2024-04-16 DIAGNOSIS — N20.0 CALCULUS OF KIDNEY: ICD-10-CM

## 2024-04-16 LAB
BASOPHILS # BLD AUTO: 0.06 X10*3/UL (ref 0–0.1)
BASOPHILS NFR BLD AUTO: 0.9 %
CD3+CD4+ CELLS # BLD: 0.49 X10E9/L
CD3+CD4+ CELLS # BLD: 490 /MM3
CD3+CD4+ CELLS NFR BLD: 31 %
CD3+CD4+ CELLS/CD3+CD8+ CLL BLD: 0.62 %
CD3+CD8+ CELLS # BLD: 0.79 X10E9/L
CD3+CD8+ CELLS NFR BLD: 50 %
EOSINOPHIL # BLD AUTO: 0.27 X10*3/UL (ref 0–0.7)
EOSINOPHIL NFR BLD AUTO: 4 %
ERYTHROCYTE [DISTWIDTH] IN BLOOD BY AUTOMATED COUNT: 16.1 % (ref 11.5–14.5)
HCT VFR BLD AUTO: 43.3 % (ref 41–52)
HGB BLD-MCNC: 12.8 G/DL (ref 13.5–17.5)
HIV1 RNA # PLAS NAA DL=20: NOT DETECTED {COPIES}/ML
HIV1 RNA SPEC NAA+PROBE-LOG#: NORMAL {LOG_COPIES}/ML
IMM GRANULOCYTES # BLD AUTO: 0.01 X10*3/UL (ref 0–0.7)
IMM GRANULOCYTES NFR BLD AUTO: 0.1 % (ref 0–0.9)
LYMPHOCYTES # BLD AUTO: 1.58 X10*3/UL (ref 1.2–4.8)
LYMPHOCYTES # SPEC AUTO: 1.58 X10*3/UL
LYMPHOCYTES NFR BLD AUTO: 23.7 %
MCH RBC QN AUTO: 33 PG (ref 26–34)
MCHC RBC AUTO-ENTMCNC: 29.6 G/DL (ref 32–36)
MCV RBC AUTO: 112 FL (ref 80–100)
MONOCYTES # BLD AUTO: 0.37 X10*3/UL (ref 0.1–1)
MONOCYTES NFR BLD AUTO: 5.5 %
NEUTROPHILS # BLD AUTO: 4.39 X10*3/UL (ref 1.2–7.7)
NEUTROPHILS NFR BLD AUTO: 65.8 %
NRBC BLD-RTO: 0 /100 WBCS (ref 0–0)
PLATELET # BLD AUTO: 318 X10*3/UL (ref 150–450)
RBC # BLD AUTO: 3.88 X10*6/UL (ref 4.5–5.9)
WBC # BLD AUTO: 6.7 X10*3/UL (ref 4.4–11.3)

## 2024-04-16 PROCEDURE — 74018 RADEX ABDOMEN 1 VIEW: CPT

## 2024-04-16 PROCEDURE — 74018 RADEX ABDOMEN 1 VIEW: CPT | Performed by: RADIOLOGY

## 2024-04-24 ENCOUNTER — LAB (OUTPATIENT)
Dept: LAB | Facility: LAB | Age: 54
End: 2024-04-24
Payer: MEDICAID

## 2024-04-24 DIAGNOSIS — Z12.5 ENCOUNTER FOR SCREENING FOR MALIGNANT NEOPLASM OF PROSTATE: ICD-10-CM

## 2024-04-24 DIAGNOSIS — N20.0 CALCULUS OF KIDNEY: Primary | ICD-10-CM

## 2024-04-24 LAB
PSA SERPL-MCNC: 0.34 NG/ML
URATE SERPL-MCNC: 4.4 MG/DL (ref 4–7.5)

## 2024-04-24 PROCEDURE — 36415 COLL VENOUS BLD VENIPUNCTURE: CPT

## 2024-04-24 PROCEDURE — 84550 ASSAY OF BLOOD/URIC ACID: CPT

## 2024-04-24 PROCEDURE — 84153 ASSAY OF PSA TOTAL: CPT

## 2024-05-22 ENCOUNTER — OFFICE VISIT (OUTPATIENT)
Dept: OPHTHALMOLOGY | Facility: CLINIC | Age: 54
End: 2024-05-22
Payer: MEDICAID

## 2024-05-22 DIAGNOSIS — H25.813 COMBINED FORMS OF AGE-RELATED CATARACT OF BOTH EYES: ICD-10-CM

## 2024-05-22 DIAGNOSIS — H52.7 REFRACTIVE ERROR: ICD-10-CM

## 2024-05-22 DIAGNOSIS — H40.1122 PRIMARY OPEN ANGLE GLAUCOMA (POAG) OF LEFT EYE, MODERATE STAGE: Primary | ICD-10-CM

## 2024-05-22 DIAGNOSIS — H40.1111 PRIMARY OPEN ANGLE GLAUCOMA (POAG) OF RIGHT EYE, MILD STAGE: ICD-10-CM

## 2024-05-22 PROCEDURE — 99203 OFFICE O/P NEW LOW 30 MIN: CPT | Performed by: OPHTHALMOLOGY

## 2024-05-22 PROCEDURE — 99213 OFFICE O/P EST LOW 20 MIN: CPT | Performed by: OPHTHALMOLOGY

## 2024-05-22 ASSESSMENT — EXTERNAL EXAM - RIGHT EYE: OD_EXAM: NORMAL

## 2024-05-22 ASSESSMENT — ENCOUNTER SYMPTOMS
PSYCHIATRIC NEGATIVE: 0
CONSTITUTIONAL NEGATIVE: 0
NEUROLOGICAL NEGATIVE: 0
RESPIRATORY NEGATIVE: 0
HEMATOLOGIC/LYMPHATIC NEGATIVE: 0
DEPRESSION: 0
MUSCULOSKELETAL NEGATIVE: 0
LOSS OF SENSATION IN FEET: 0
OCCASIONAL FEELINGS OF UNSTEADINESS: 0
ALLERGIC/IMMUNOLOGIC NEGATIVE: 0
CARDIOVASCULAR NEGATIVE: 0
GASTROINTESTINAL NEGATIVE: 0
EYES NEGATIVE: 0
ENDOCRINE NEGATIVE: 0

## 2024-05-22 ASSESSMENT — VISUAL ACUITY
OD_CC+: +1
OS_CC+: -1
OD_CC: 20/30
METHOD: SNELLEN - LINEAR
OS_CC: 20/30
CORRECTION_TYPE: GLASSES

## 2024-05-22 ASSESSMENT — EXTERNAL EXAM - LEFT EYE: OS_EXAM: NORMAL

## 2024-05-22 ASSESSMENT — PATIENT HEALTH QUESTIONNAIRE - PHQ9
1. LITTLE INTEREST OR PLEASURE IN DOING THINGS: NOT AT ALL
2. FEELING DOWN, DEPRESSED OR HOPELESS: NOT AT ALL
SUM OF ALL RESPONSES TO PHQ9 QUESTIONS 1 AND 2: 0

## 2024-05-22 ASSESSMENT — REFRACTION_WEARINGRX
OS_ADD: +2.50
OS_AXIS: 041
OS_SPHERE: -3.50
SPECS_TYPE: PAL
OD_CYLINDER: -1.50
OD_SPHERE: -2.50
OS_CYLINDER: -1.75
OD_AXIS: 132
OD_ADD: +2.50

## 2024-05-22 ASSESSMENT — TONOMETRY
OS_IOP_MMHG: 10
IOP_METHOD: GOLDMANN APPLANATION
OD_IOP_MMHG: 10

## 2024-05-22 ASSESSMENT — SLIT LAMP EXAM - LIDS
COMMENTS: 1+ BLEPHARITIS
COMMENTS: 1+ BLEPHARITIS

## 2024-05-22 ASSESSMENT — PAIN SCALES - GENERAL: PAINLEVEL: 0-NO PAIN

## 2024-05-22 NOTE — PROGRESS NOTES
Subjective   Patient ID: Jet Pearl is a 53 y.o. male.    Chief Complaint    New Patient Visit       HPI    Was referred by Dr. Gore because she no longer accepts his insurance. Is currently taking Latanoprost at bedtime OU, Timolol qday OD, Cosopt BID OS. Pt is also experiencing some blurred vision at night with glare.     Here to establish care.  Saw Kenzie Gore at \[facility]\"" and has recent onset severe glaucoma OS and mild to moderate OD.  Using latan ou at bedtime, cosopt os bid, and romain od qam.    Last edited by Jez Faustin MD on 5/22/2024 10:18 AM.        Current Outpatient Medications (Ophthalmology pharm classes)   Medication Sig Dispense Refill    dorzolamide-timoloL (Cosopt) 22.3-6.8 mg/mL ophthalmic solution INSTILL 1 DROP IN LEFT EYE TWICE DAILY      latanoprost (Xalatan) 0.005 % ophthalmic solution Administer 1 drop into both eyes once daily at bedtime.      timolol (Timoptic) 0.5 % ophthalmic solution Administer 1 drop into both eyes once daily.       Current Outpatient Medications (Other)   Medication Sig Dispense Refill    elviteg-cob-emtri-tenof ALAFEN (Genvoya) [phone] mg tablet TAKE 1 TABLET BY MOUTH ONCE DAILY WITH FOOD. STORE IN ORIGINAL CONTAINER AT ROOM TEMPERATURE 30 tablet 5    loperamide (Imodium) 2 mg capsule Take 1 capsule (2 mg) by mouth 4 times a day as needed.      miscellaneous medical supply (Blood Pressure Cuff) misc Monitor blood pressure 3-4 times weekly 1 each 0    multivitamin tablet Take 1 tablet by mouth once daily.      multivitamin with minerals (MULTIPLE VITAMIN-MINERALS ORAL) as directed Orally      cefuroxime (Ceftin) 250 mg tablet Take 1 tablet (250 mg) by mouth 2 times a day. (Patient not taking: Reported on 4/15/2024) 10 tablet 0    cefuroxime (Ceftin) 500 mg tablet Take 1 tablet (500 mg) by mouth 2 times a day. (Patient not taking: Reported on 4/15/2024)      oxyCODONE (Roxicodone) 5 mg immediate release tablet Take 1 tablet (5 mg) by mouth every 8 hours  if needed for severe pain (7 - 10). (Patient not taking: Reported on 4/15/2024) 10 tablet 0       Objective   Base Eye Exam       Visual Acuity (Snellen - Linear)         Right Left    Dist cc 20/30 +1 20/30 -1      Correction: Glasses              Tonometry (Goldmann Applanation, 10:24 AM)         Right Left    Pressure 10 10              Pupils         Dark Shape React APD    Right 5 Round 2 None    Left 5 Round 2 None              Extraocular Movement         Right Left     Full Full                  Slit Lamp and Fundus Exam       External Exam         Right Left    External Normal Normal              Slit Lamp Exam         Right Left    Lids/Lashes 1+ Blepharitis 1+ Blepharitis    Conjunctiva/Sclera White and quiet White and quiet    Cornea Clear Clear    Anterior Chamber Deep and quiet Deep and quiet    Iris Round and reactive Inferior Nevus    Lens Nuclear sclerosis, Cortical cataract Nuclear sclerosis, Cortical cataract    Anterior Vitreous Vitreous syneresis Vitreous syneresis                  Refraction       Wearing Rx         Sphere Cylinder Axis Add    Right -2.50 -1.50 132 +2.50    Left -3.50 -1.75 041 +2.50      Age: 1m    Type: PAL                    Assessment/Plan   Problem List Items Addressed This Visit          Eye/Vision problems    Primary open angle glaucoma (POAG) of left eye, moderate stage - Primary     F/u next avail full with oct nerves.  Cont all meds.           Primary open angle glaucoma (POAG) of right eye, mild stage    Combined forms of age-related cataract of both eyes    Refractive error

## 2024-07-03 ENCOUNTER — APPOINTMENT (OUTPATIENT)
Dept: PRIMARY CARE | Facility: CLINIC | Age: 54
End: 2024-07-03
Payer: MEDICAID

## 2024-07-03 ENCOUNTER — OFFICE VISIT (OUTPATIENT)
Dept: PRIMARY CARE | Facility: CLINIC | Age: 54
End: 2024-07-03
Payer: MEDICAID

## 2024-07-03 VITALS
SYSTOLIC BLOOD PRESSURE: 122 MMHG | HEART RATE: 78 BPM | RESPIRATION RATE: 16 BRPM | WEIGHT: 158 LBS | BODY MASS INDEX: 21.4 KG/M2 | DIASTOLIC BLOOD PRESSURE: 76 MMHG | TEMPERATURE: 97.9 F | OXYGEN SATURATION: 98 % | HEIGHT: 72 IN

## 2024-07-03 DIAGNOSIS — Z00.00 ROUTINE GENERAL MEDICAL EXAMINATION AT A HEALTH CARE FACILITY: ICD-10-CM

## 2024-07-03 DIAGNOSIS — B20 HIV INFECTION, UNSPECIFIED SYMPTOM STATUS (MULTI): ICD-10-CM

## 2024-07-03 DIAGNOSIS — F41.8 DEPRESSION WITH ANXIETY: ICD-10-CM

## 2024-07-03 DIAGNOSIS — Z00.00 ROUTINE MEDICAL EXAM: Primary | ICD-10-CM

## 2024-07-03 PROCEDURE — 1036F TOBACCO NON-USER: CPT | Performed by: FAMILY MEDICINE

## 2024-07-03 PROCEDURE — 3060F POS MICROALBUMINURIA REV: CPT | Performed by: FAMILY MEDICINE

## 2024-07-03 PROCEDURE — 3074F SYST BP LT 130 MM HG: CPT | Performed by: FAMILY MEDICINE

## 2024-07-03 PROCEDURE — 3044F HG A1C LEVEL LT 7.0%: CPT | Performed by: FAMILY MEDICINE

## 2024-07-03 PROCEDURE — 3078F DIAST BP <80 MM HG: CPT | Performed by: FAMILY MEDICINE

## 2024-07-03 PROCEDURE — 99396 PREV VISIT EST AGE 40-64: CPT | Performed by: FAMILY MEDICINE

## 2024-07-03 ASSESSMENT — COLUMBIA-SUICIDE SEVERITY RATING SCALE - C-SSRS
2. HAVE YOU ACTUALLY HAD ANY THOUGHTS OF KILLING YOURSELF?: NO
6. HAVE YOU EVER DONE ANYTHING, STARTED TO DO ANYTHING, OR PREPARED TO DO ANYTHING TO END YOUR LIFE?: NO
1. IN THE PAST MONTH, HAVE YOU WISHED YOU WERE DEAD OR WISHED YOU COULD GO TO SLEEP AND NOT WAKE UP?: NO

## 2024-07-03 ASSESSMENT — PAIN SCALES - GENERAL: PAINLEVEL: 0-NO PAIN

## 2024-07-03 ASSESSMENT — ENCOUNTER SYMPTOMS
LOSS OF SENSATION IN FEET: 0
DEPRESSION: 0
OCCASIONAL FEELINGS OF UNSTEADINESS: 0

## 2024-07-03 NOTE — PROGRESS NOTES
Subjective   Patient ID: Jet Pearl is a 54 y.o. male who presents for Annual Exam (Pt is here for pe, pt is not fasting.).    HPI   Hx of prior ileostomy that was reversed.  (Secondary to ruptured appy)  recent kidney stones and sees urology for this.  Colonoscopy was up to date.  Hx of HIV.  He has been monitoring bp and has been borderline elevated occasionally.  No fam hx of heart disease.   Review of Systems  See HPI  Objective   /76 (BP Location: Left arm, Patient Position: Sitting, BP Cuff Size: Adult)   Pulse 78   Temp 36.6 °C (97.9 °F) (Temporal)   Resp 16   Ht 1.829 m (6')   Wt 71.7 kg (158 lb)   SpO2 98%   BMI 21.43 kg/m²     Physical Exam  Vitals and nursing note reviewed.   Constitutional:       General: He is not in acute distress.  HENT:      Right Ear: Tympanic membrane and ear canal normal.      Left Ear: Tympanic membrane and ear canal normal.      Nose: Nose normal. No rhinorrhea.      Mouth/Throat:      Pharynx: Oropharynx is clear. No oropharyngeal exudate or posterior oropharyngeal erythema.      Comments: Dentition wnl  Eyes:      Extraocular Movements: Extraocular movements intact.      Conjunctiva/sclera: Conjunctivae normal.      Pupils: Pupils are equal, round, and reactive to light.   Neck:      Vascular: No carotid bruit.   Cardiovascular:      Rate and Rhythm: Normal rate and regular rhythm.      Heart sounds: Normal heart sounds. No murmur heard.  Pulmonary:      Breath sounds: Normal breath sounds. No wheezing or rhonchi.   Abdominal:      General: Bowel sounds are normal. There is no distension.      Palpations: Abdomen is soft. There is no mass.      Tenderness: There is no abdominal tenderness. There is no guarding or rebound.      Hernia: No hernia is present.   Musculoskeletal:         General: No swelling or tenderness. Normal range of motion.      Cervical back: Normal range of motion and neck supple.   Lymphadenopathy:      Cervical: No cervical adenopathy.    Skin:     General: Skin is warm.      Findings: No rash.   Neurological:      General: No focal deficit present.      Mental Status: He is alert.         Assessment/Plan   Problem List Items Addressed This Visit             ICD-10-CM    Depression with anxiety F41.8    Human immunodeficiency virus infection (Multi) B20     Other Visit Diagnoses         Codes    Routine medical exam    -  Primary Z00.00    Relevant Orders    TSH with reflex to Free T4 if abnormal    Lipid Panel    CT cardiac scoring wo IV contrast    Routine general medical examination at a health care facility     Z00.00             Keep bp log and call 1 month with average.

## 2024-07-08 ENCOUNTER — LAB (OUTPATIENT)
Dept: LAB | Facility: LAB | Age: 54
End: 2024-07-08
Payer: MEDICAID

## 2024-07-08 DIAGNOSIS — Z00.00 ROUTINE MEDICAL EXAM: ICD-10-CM

## 2024-07-08 LAB
CHOLEST SERPL-MCNC: 126 MG/DL (ref 0–199)
CHOLESTEROL/HDL RATIO: 3.1
HDLC SERPL-MCNC: 40.9 MG/DL
LDLC SERPL CALC-MCNC: 64 MG/DL
NON HDL CHOLESTEROL: 85 MG/DL (ref 0–149)
TRIGL SERPL-MCNC: 106 MG/DL (ref 0–149)
TSH SERPL-ACNC: 0.83 MIU/L (ref 0.44–3.98)
VLDL: 21 MG/DL (ref 0–40)

## 2024-07-08 PROCEDURE — 84443 ASSAY THYROID STIM HORMONE: CPT

## 2024-07-08 PROCEDURE — 36415 COLL VENOUS BLD VENIPUNCTURE: CPT

## 2024-07-08 PROCEDURE — 80061 LIPID PANEL: CPT

## 2024-07-17 ENCOUNTER — APPOINTMENT (OUTPATIENT)
Dept: OPHTHALMOLOGY | Facility: CLINIC | Age: 54
End: 2024-07-17
Payer: MEDICAID

## 2024-07-17 DIAGNOSIS — H52.7 REFRACTIVE ERROR: ICD-10-CM

## 2024-07-17 DIAGNOSIS — H35.372 EPIRETINAL MEMBRANE (ERM) OF LEFT EYE: ICD-10-CM

## 2024-07-17 DIAGNOSIS — H40.1122 PRIMARY OPEN ANGLE GLAUCOMA (POAG) OF LEFT EYE, MODERATE STAGE: Primary | ICD-10-CM

## 2024-07-17 DIAGNOSIS — H25.813 COMBINED FORMS OF AGE-RELATED CATARACT OF BOTH EYES: ICD-10-CM

## 2024-07-17 DIAGNOSIS — H40.1111 PRIMARY OPEN ANGLE GLAUCOMA (POAG) OF RIGHT EYE, MILD STAGE: ICD-10-CM

## 2024-07-17 PROBLEM — E11.9 DIABETES MELLITUS WITHOUT COMPLICATION (MULTI): Status: RESOLVED | Noted: 2023-08-22 | Resolved: 2024-07-17

## 2024-07-17 PROCEDURE — 92133 CPTRZD OPH DX IMG PST SGM ON: CPT | Performed by: OPHTHALMOLOGY

## 2024-07-17 PROCEDURE — 99214 OFFICE O/P EST MOD 30 MIN: CPT | Performed by: OPHTHALMOLOGY

## 2024-07-17 PROCEDURE — 92015 DETERMINE REFRACTIVE STATE: CPT | Performed by: OPHTHALMOLOGY

## 2024-07-17 ASSESSMENT — ENCOUNTER SYMPTOMS
NEUROLOGICAL NEGATIVE: 0
MUSCULOSKELETAL NEGATIVE: 1
HEMATOLOGIC/LYMPHATIC NEGATIVE: 0
GASTROINTESTINAL NEGATIVE: 0
ENDOCRINE NEGATIVE: 0
EYES NEGATIVE: 0
PSYCHIATRIC NEGATIVE: 0
CONSTITUTIONAL NEGATIVE: 0
RESPIRATORY NEGATIVE: 0
CARDIOVASCULAR NEGATIVE: 0
ALLERGIC/IMMUNOLOGIC NEGATIVE: 0

## 2024-07-17 ASSESSMENT — SLIT LAMP EXAM - LIDS
COMMENTS: 1+ DERMATOCHALASIS - UPPER LID, 1+ BLEPHARITIS
COMMENTS: 1+ DERMATOCHALASIS - UPPER LID, 1+ BLEPHARITIS

## 2024-07-17 ASSESSMENT — CUP TO DISC RATIO
OD_RATIO: 0.4
OS_RATIO: 0.8

## 2024-07-17 ASSESSMENT — REFRACTION_WEARINGRX
OS_SPHERE: -3.75
OS_CYLINDER: -1.75
OS_ADD: +2.50
OD_ADD: +2.50
OD_SPHERE: -2.50
OD_AXIS: 131
OS_AXIS: 044
OD_CYLINDER: -1.50

## 2024-07-17 ASSESSMENT — KERATOMETRY
OD_K2POWER_DIOPTERS: 45.50
OD_AXISANGLE2_DEGREES: 100
OD_AXISANGLE_DEGREES: 10
OS_K1POWER_DIOPTERS: 44.50
OS_AXISANGLE_DEGREES: 140
METHOD_AUTO_MANUAL: AUTOMATED
OS_AXISANGLE2_DEGREES: 50
OS_K2POWER_DIOPTERS: 45.50
OD_K1POWER_DIOPTERS: 44.75

## 2024-07-17 ASSESSMENT — TONOMETRY
IOP_METHOD: GOLDMANN APPLANATION
OS_IOP_MMHG: 11
OD_IOP_MMHG: 12

## 2024-07-17 ASSESSMENT — VISUAL ACUITY
METHOD: SNELLEN - SINGLE
OS_CC+: +1
OD_CC+: +1
OS_CC: 20/40
CORRECTION_TYPE: GLASSES
OD_CC: 20/40

## 2024-07-17 ASSESSMENT — REFRACTION_MANIFEST
OS_AXIS: 045
OS_CYLINDER: -1.75
OD_CYLINDER: -1.25
METHOD_AUTOREFRACTION: 1
OD_SPHERE: -3.50
OD_AXIS: 120
OS_SPHERE: -3.00

## 2024-07-17 ASSESSMENT — EXTERNAL EXAM - RIGHT EYE: OD_EXAM: NORMAL

## 2024-07-17 ASSESSMENT — PAIN SCALES - GENERAL: PAINLEVEL: 0-NO PAIN

## 2024-07-17 ASSESSMENT — PATIENT HEALTH QUESTIONNAIRE - PHQ9
2. FEELING DOWN, DEPRESSED OR HOPELESS: NOT AT ALL
SUM OF ALL RESPONSES TO PHQ9 QUESTIONS 1 AND 2: 0
1. LITTLE INTEREST OR PLEASURE IN DOING THINGS: NOT AT ALL

## 2024-07-17 ASSESSMENT — EXTERNAL EXAM - LEFT EYE: OS_EXAM: NORMAL

## 2024-07-17 NOTE — PROGRESS NOTES
Subjective   Patient ID: Jet Pearl is a 54 y.o. male.    Chief Complaint    Annual Exam       HPI    Bright light and glare sensitivity.    Complete and glaucoma exam.  Recently had kidney stones and now with arthritis.  On new meds.  Vision may be a bit worse.    Last edited by Jez Faustin MD on 7/17/2024 10:50 AM.        Current Outpatient Medications (Ophthalmology pharm classes)   Medication Sig Dispense Refill    dorzolamide-timoloL (Cosopt) 22.3-6.8 mg/mL ophthalmic solution INSTILL 1 DROP IN LEFT EYE TWICE DAILY      latanoprost (Xalatan) 0.005 % ophthalmic solution Administer 1 drop into both eyes once daily at bedtime.      timolol (Timoptic) 0.5 % ophthalmic solution Administer 1 drop into the right eye once daily in the morning.       Current Outpatient Medications (Other)   Medication Sig Dispense Refill    elviteg-cob-emtri-tenof ALAFEN (Genvoya) [phone] mg tablet TAKE 1 TABLET BY MOUTH ONCE DAILY WITH FOOD. STORE IN ORIGINAL CONTAINER AT ROOM TEMPERATURE 30 tablet 5    loperamide (Imodium) 2 mg capsule Take 1 capsule (2 mg) by mouth 4 times a day as needed.      miscellaneous medical supply (Blood Pressure Cuff) misc Monitor blood pressure 3-4 times weekly 1 each 0    multivitamin with minerals (MULTIPLE VITAMIN-MINERALS ORAL) as directed Orally      multivitamin tablet Take 1 tablet by mouth once daily.         Objective   Base Eye Exam       Visual Acuity (Snellen - Single)         Right Left Both    Dist cc 20/40 +1 20/40 +1     Near cc   J1+      Correction: Glasses              Tonometry (Goldmann Applanation, 9:26 AM)         Right Left    Pressure 12 11              Pupils         Dark Shape React APD    Right 4 Round 2 None    Left 4 Round 2 None              Extraocular Movement         Right Left     Full Full              Dilation       Both eyes: 1% Tropic 2.5% Phen @ 9:26 AM                  Additional Tests       Keratometry (Automated)         K1 Axis K2 Axis    Right  44.75 100 45.50 10    Left 44.50 50 45.50 140                  Slit Lamp and Fundus Exam       External Exam         Right Left    External Normal Normal              Slit Lamp Exam         Right Left    Lids/Lashes 1+ Dermatochalasis - upper lid, 1+ Blepharitis 1+ Dermatochalasis - upper lid, 1+ Blepharitis    Conjunctiva/Sclera White and quiet White and quiet    Cornea Clear Clear    Anterior Chamber Deep and quiet Deep and quiet    Iris Round and reactive Inferior Nevus    Lens 2+ Nuclear sclerosis, 2+ Cortical cataract 2+ Nuclear sclerosis, 1+ Cortical cataract    Anterior Vitreous Vitreous syneresis Vitreous syneresis              Fundus Exam         Right Left    Disc Normal Thin rim    C/D Ratio 0.4 0.8    Macula Normal beware Epiretinal membrane    Vessels Normal Normal    Periphery Normal Normal                  Refraction       Wearing Rx         Sphere Cylinder Axis Add    Right -2.50 -1.50 131 +2.50    Left -3.75 -1.75 044 +2.50              Manifest Refraction (Auto)         Sphere Cylinder Axis    Right -3.50 -1.25 120    Left -3.00 -1.75 045      Pupillary Distance: 63              Final Rx         Sphere Cylinder [place] Dist VA Add Near VA    Right -3.25 -1.25 130 20/25 +2.50 J1+    Left -3.50 -1.75 050 20/30 +2.50 J1+      Type: progressive    Expiration Date: 7/17/2026    Pupillary Distance: 63                    Assessment/Plan   Problem List Items Addressed This Visit          Eye/Vision problems    Primary open angle glaucoma (POAG) of left eye, moderate stage - Primary     Cont latan ou at bedtime, cosopt os bid, and romain od qam.  F/u 4 mos hvf 30-2, iop, and pachy with oct mac.  No dilation.  Disp new rx.  Follow cats od>os.  Consider changing cosopt to ou bid and stop romain od.  Eventually go q6mos.          Relevant Orders    OCT, Optic Nerve - OU - Both Eyes (Completed)    Primary open angle glaucoma (POAG) of right eye, mild stage    Relevant Orders    OCT, Optic Nerve - OU - Both Eyes  (Completed)    Combined forms of age-related cataract of both eyes    Refractive error    Epiretinal membrane (ERM) of left eye

## 2024-07-17 NOTE — ASSESSMENT & PLAN NOTE
Cont latan ou at bedtime, cosopt os bid, and romain od qam.  F/u 4 mos hvf 30-2, iop, and pachy with oct mac.  No dilation.  Disp new rx.  Follow cats od>os.  Consider changing cosopt to ou bid and stop romain od.  Eventually go q6mos.

## 2024-08-07 ENCOUNTER — DOCUMENTATION (OUTPATIENT)
Dept: IMMUNOLOGY | Facility: CLINIC | Age: 54
End: 2024-08-07
Payer: MEDICAID

## 2024-08-07 NOTE — PROGRESS NOTES
SW completed and submitted O[facility]AP for approval. Will e-mail pt when approved.   Susanne Murcia, LUIS ENRIQUE MEDRANO

## 2024-08-19 ENCOUNTER — OFFICE VISIT (OUTPATIENT)
Dept: IMMUNOLOGY | Facility: CLINIC | Age: 54
End: 2024-08-19
Payer: MEDICAID

## 2024-08-19 ENCOUNTER — APPOINTMENT (OUTPATIENT)
Dept: IMMUNOLOGY | Facility: CLINIC | Age: 54
End: 2024-08-19
Payer: MEDICAID

## 2024-08-19 VITALS
HEART RATE: 67 BPM | BODY MASS INDEX: 21.59 KG/M2 | WEIGHT: 159.2 LBS | DIASTOLIC BLOOD PRESSURE: 80 MMHG | SYSTOLIC BLOOD PRESSURE: 135 MMHG

## 2024-08-19 DIAGNOSIS — I10 PRIMARY HYPERTENSION: Primary | ICD-10-CM

## 2024-08-19 DIAGNOSIS — Z77.21 PERSONAL HISTORY OF EXPOSURE TO POTENTIALLY HAZARDOUS BODY FLUIDS: ICD-10-CM

## 2024-08-19 DIAGNOSIS — B20 HIV INFECTION, UNSPECIFIED SYMPTOM STATUS (MULTI): ICD-10-CM

## 2024-08-19 LAB
ALBUMIN SERPL BCP-MCNC: 3.9 G/DL (ref 3.4–5)
ANION GAP SERPL CALC-SCNC: 11 MMOL/L (ref 10–20)
BASOPHILS # BLD AUTO: 0.05 X10*3/UL (ref 0–0.1)
BASOPHILS NFR BLD AUTO: 0.9 %
BUN SERPL-MCNC: 17 MG/DL (ref 6–23)
CALCIUM SERPL-MCNC: 9 MG/DL (ref 8.6–10.6)
CHLORIDE SERPL-SCNC: 107 MMOL/L (ref 98–107)
CO2 SERPL-SCNC: 30 MMOL/L (ref 21–32)
CREAT SERPL-MCNC: 1.38 MG/DL (ref 0.5–1.3)
EGFRCR SERPLBLD CKD-EPI 2021: 60 ML/MIN/1.73M*2
EOSINOPHIL # BLD AUTO: 0.25 X10*3/UL (ref 0–0.7)
EOSINOPHIL NFR BLD AUTO: 4.6 %
ERYTHROCYTE [DISTWIDTH] IN BLOOD BY AUTOMATED COUNT: 14.6 % (ref 11.5–14.5)
GLUCOSE SERPL-MCNC: 67 MG/DL (ref 74–99)
HCT VFR BLD AUTO: 39.2 % (ref 41–52)
HGB BLD-MCNC: 12.4 G/DL (ref 13.5–17.5)
IMM GRANULOCYTES # BLD AUTO: 0.01 X10*3/UL (ref 0–0.7)
IMM GRANULOCYTES NFR BLD AUTO: 0.2 % (ref 0–0.9)
LYMPHOCYTES # BLD AUTO: 1.87 X10*3/UL (ref 1.2–4.8)
LYMPHOCYTES NFR BLD AUTO: 34.4 %
MCH RBC QN AUTO: 32.5 PG (ref 26–34)
MCHC RBC AUTO-ENTMCNC: 31.6 G/DL (ref 32–36)
MCV RBC AUTO: 103 FL (ref 80–100)
MONOCYTES # BLD AUTO: 0.55 X10*3/UL (ref 0.1–1)
MONOCYTES NFR BLD AUTO: 10.1 %
NEUTROPHILS # BLD AUTO: 2.7 X10*3/UL (ref 1.2–7.7)
NEUTROPHILS NFR BLD AUTO: 49.8 %
NRBC BLD-RTO: 0 /100 WBCS (ref 0–0)
PHOSPHATE SERPL-MCNC: 4 MG/DL (ref 2.5–4.9)
PLATELET # BLD AUTO: 217 X10*3/UL (ref 150–450)
POTASSIUM SERPL-SCNC: 4.3 MMOL/L (ref 3.5–5.3)
RBC # BLD AUTO: 3.81 X10*6/UL (ref 4.5–5.9)
SODIUM SERPL-SCNC: 144 MMOL/L (ref 136–145)
WBC # BLD AUTO: 5.4 X10*3/UL (ref 4.4–11.3)

## 2024-08-19 PROCEDURE — 99214 OFFICE O/P EST MOD 30 MIN: CPT | Performed by: NURSE PRACTITIONER

## 2024-08-19 PROCEDURE — 80069 RENAL FUNCTION PANEL: CPT | Performed by: NURSE PRACTITIONER

## 2024-08-19 PROCEDURE — 36415 COLL VENOUS BLD VENIPUNCTURE: CPT | Performed by: NURSE PRACTITIONER

## 2024-08-19 PROCEDURE — 88185 FLOWCYTOMETRY/TC ADD-ON: CPT | Performed by: NURSE PRACTITIONER

## 2024-08-19 PROCEDURE — 85025 COMPLETE CBC W/AUTO DIFF WBC: CPT | Performed by: NURSE PRACTITIONER

## 2024-08-19 PROCEDURE — 3079F DIAST BP 80-89 MM HG: CPT | Performed by: NURSE PRACTITIONER

## 2024-08-19 PROCEDURE — 3075F SYST BP GE 130 - 139MM HG: CPT | Performed by: NURSE PRACTITIONER

## 2024-08-19 PROCEDURE — 1036F TOBACCO NON-USER: CPT | Performed by: NURSE PRACTITIONER

## 2024-08-19 PROCEDURE — 87536 HIV-1 QUANT&REVRSE TRNSCRPJ: CPT | Performed by: NURSE PRACTITIONER

## 2024-08-19 ASSESSMENT — ENCOUNTER SYMPTOMS
GASTROINTESTINAL NEGATIVE: 1
ALLERGIC/IMMUNOLOGIC NEGATIVE: 1
HEMATOLOGIC/LYMPHATIC NEGATIVE: 1
NEUROLOGICAL NEGATIVE: 1
ENDOCRINE NEGATIVE: 1
RESPIRATORY NEGATIVE: 1
CONSTITUTIONAL NEGATIVE: 1

## 2024-08-19 NOTE — PROGRESS NOTES
HIV Clinic Follow-up Visit:    Jet Pearl was last seen in [facility] on 4/15/2024    Missed antiretroviral doses in last 72 hours? No    Sexually active? no, Partner/s aware of diagnosis? yes,     Condom use? Sometimes, Partner on PrEP? No    Tobacco use: No   No alcohol.  Some marijuana.    SUBJECTIVE: 53 YO male in for routine follow up.   He brought his BP log (over a month's worth) and they are mostly ok - some 80s that I would like to see lower.  We talked about salt intake and he will address that for the next 4 months; before we add a BP med.      Review of Systems  Review of Systems   Constitutional: Negative.    HENT:          He is having some work done on a tooth at the dental.   Praises it's efficiency     Eyes:         He will be getting new glasses soon.     Respiratory: Negative.     Cardiovascular:         He has CT cardiac score scheduled for October .   Gastrointestinal: Negative.    Endocrine: Negative.    Genitourinary:         Sees nephrologist on Monday.     We will draw a renal panel for them.    Musculoskeletal:         Hit the outside of his R foot and it has been sore for about a month - black and blue; which has faded, but still sore    Skin: Negative.    Allergic/Immunologic: Negative.    Neurological: Negative.    Hematological: Negative.    Psychiatric/Behavioral:          He is managing his anxiety and depression pretty well at this time.        CURRENT MEDICATIONS:    Current Outpatient Medications:     dorzolamide-timoloL (Cosopt) 22.3-6.8 mg/mL ophthalmic solution, INSTILL 1 DROP IN LEFT EYE TWICE DAILY, Disp: , Rfl:     elviteg-cob-emtri-tenof ALAFEN (Genvoya) [id number] mg tablet, TAKE 1 TABLET BY MOUTH ONCE DAILY WITH FOOD. STORE IN ORIGINAL CONTAINER AT ROOM TEMPERATURE, Disp: 30 tablet, Rfl: 5    latanoprost (Xalatan) 0.005 % ophthalmic solution, Administer 1 drop into both eyes once daily at bedtime., Disp: , Rfl:     loperamide (Imodium) 2 mg capsule, Take 1 capsule (2 mg)  by mouth 4 times a day as needed., Disp: , Rfl:     miscellaneous medical supply (Blood Pressure Cuff) misc, Monitor blood pressure 3-4 times weekly, Disp: 1 each, Rfl: 0    multivitamin with minerals (MULTIPLE VITAMIN-MINERALS ORAL), as directed Orally, Disp: , Rfl:     timolol (Timoptic) 0.5 % ophthalmic solution, Administer 1 drop into the right eye once daily in the morning., Disp: , Rfl:     multivitamin tablet, Take 1 tablet by mouth once daily., Disp: , Rfl:     PHYSICAL EXAMINATION:  Visit Vitals  /80 (BP Location: Left arm)   Pulse 67   Wt 72.2 kg (159 lb 3.2 oz)   BMI 21.59 kg/m²   Smoking Status Never   BSA 1.92 m²       Physical Exam   Physical Exam  Vitals reviewed.   Constitutional:       Appearance: Normal appearance.   HENT:      Head: Normocephalic.   Cardiovascular:      Rate and Rhythm: Normal rate and regular rhythm.      Heart sounds: Normal heart sounds.   Pulmonary:      Effort: Pulmonary effort is normal.      Breath sounds: Normal breath sounds.   Abdominal:      General: Bowel sounds are normal.      Palpations: Abdomen is soft.   Musculoskeletal:         General: Normal range of motion.      Cervical back: Normal range of motion and neck supple.   Skin:     General: Skin is warm and dry.   Neurological:      Mental Status: He is alert and oriented to person, place, and time.   Psychiatric:         Mood and Affect: Mood normal.         Behavior: Behavior normal.         PERTINENT DATA:  CBC:  WBC   Date Value Ref Range Status   04/15/2024 6.7 4.4 - 11.3 x10*3/uL Final     nRBC   Date Value Ref Range Status   04/15/2024 0.0 0.0 - 0.0 /100 WBCs Final     RBC   Date Value Ref Range Status   04/15/2024 3.88 (L) 4.50 - 5.90 x10*6/uL Final     Hemoglobin   Date Value Ref Range Status   04/15/2024 12.8 (L) 13.5 - 17.5 g/dL Final     MCV   Date Value Ref Range Status   04/15/2024 112 (H) 80 - 100 fL Final     RDW   Date Value Ref Range Status   04/15/2024 16.1 (H) 11.5 - 14.5 % Final        Renal Function Panel:  Glucose   Date Value Ref Range Status   04/15/2024 62 (L) 74 - 99 mg/dL Final     Sodium   Date Value Ref Range Status   04/15/2024 143 136 - 145 mmol/L Final     Potassium   Date Value Ref Range Status   04/15/2024 4.5 3.5 - 5.3 mmol/L Final     Chloride   Date Value Ref Range Status   04/15/2024 105 98 - 107 mmol/L Final     HCO3, Arterial   Date Value Ref Range Status   05/09/2022 33.3 (H) 22 - 28 MMOL/L Final     Anion Gap   Date Value Ref Range Status   04/15/2024 12 10 - 20 mmol/L Final     Urea Nitrogen   Date Value Ref Range Status   04/15/2024 14 6 - 23 mg/dL Final     Creatinine   Date Value Ref Range Status   04/15/2024 1.28 0.50 - 1.30 mg/dL Final     eGFR   Date Value Ref Range Status   04/15/2024 66 >60 mL/min/1.73m*2 Final     Comment:     Calculations of estimated GFR are performed using the 2021 CKD-EPI Study Refit equation without the race variable for the IDMS-Traceable creatinine methods.  https://jasn.asnjournals.org/content/early/2021/09/22/ASN.6557233140     Calcium   Date Value Ref Range Status   04/15/2024 9.1 8.6 - 10.6 mg/dL Final     Phosphorus   Date Value Ref Range Status   03/24/2024 2.9 2.5 - 4.5 mg/dL Final     Albumin   Date Value Ref Range Status   04/15/2024 3.6 3.4 - 5.0 g/dL Final       Hepatic Panel:  Albumin   Date Value Ref Range Status   04/15/2024 3.6 3.4 - 5.0 g/dL Final     Bilirubin, Total   Date Value Ref Range Status   04/15/2024 0.4 0.0 - 1.2 mg/dL Final     Bilirubin, Direct   Date Value Ref Range Status   07/01/2022 0.3 (H) 0.0 - 0.2 MG/DL Final     Alkaline Phosphatase   Date Value Ref Range Status   04/15/2024 98 33 - 120 U/L Final     ALT   Date Value Ref Range Status   04/15/2024 13 10 - 52 U/L Final     Comment:     Patients treated with Sulfasalazine may generate falsely decreased results for ALT.       HIV Viral Load:  HIV-1 RNA PCR Viral Load Log   Date Value Ref Range Status   04/15/2024   Final     Comment:     Not calculated      HIV RNA Result   Date Value Ref Range Status   04/15/2024 Not Detected Not Detected Final       CD4 Count:  CD3+CD4+%   Date Value Ref Range Status   04/15/2024 31 29 - 57 % Final     CD3+CD4+ Absolute   Date Value Ref Range Status   04/15/2024 0.490 0.350 - 2.740 x10E9/L Final     CD3+CD8+%   Date Value Ref Range Status   04/15/2024 50 (H) 7 - 31 % Final     CD3+CD8+ Absolute   Date Value Ref Range Status   04/15/2024 0.790 0.080 - 1.490 x10E9/L Final     CD4/CD8 Ratio   Date Value Ref Range Status   04/15/2024 0.62 (L) 1.00 - 2.60 Final     CD45%   Date Value Ref Range Status   04/04/2023 100 % Final       CRCL:  Creatinine, Urine Random   Date Value Ref Range Status   03/22/2024 172.9 NOT ESTABLISHED mg/dL Final       Lipid Panel:  HDL-Cholesterol   Date Value Ref Range Status   07/08/2024 40.9 mg/dL Final     Comment:       Age       Very Low   Low     Normal    High    0-19 Y    < 35      < 40     40-45     ----  20-24 Y    ----     < 40      >45      ----        >24 Y      ----     < 40     40-60      >60       Cholesterol/HDL Ratio   Date Value Ref Range Status   07/08/2024 3.1  Final     Comment:       Ref Values  Desirable  < 3.4  High Risk  > 5.0     LDL   Date Value Ref Range Status   04/04/2023 55 0 - 99 mg/dL Final     Comment:     .                           NEAR      BORD      AGE      DESIRABLE  OPTIMAL    HIGH     HIGH     VERY HIGH     0-19 Y     0 - 109     ---    110-129   >/= 130     ----    20-24 Y     0 - 119     ---    120-159   >/= 160     ----      >24 Y     0 -  99   100-129  130-159   160-189     >/=190  .       VLDL   Date Value Ref Range Status   07/08/2024 21 0 - 40 mg/dL Final     Triglycerides   Date Value Ref Range Status   07/08/2024 106 0 - 149 mg/dL Final     Comment:        Age         Desirable   Borderline High   High     Very High   0 D-90 D    19 - 174         ----         ----        ----  91 D- 9 Y     0 -  74        75 -  99     >/= 100      ----    10-19 Y     0 -   89        90 - 129     >/= 130      ----    20-24 Y     0 - 114       115 - 149     >/= 150      ----         >24 Y     0 - 149       150 - 199    200- 499    >/= 500    Venipuncture immediately after or during the administration of Metamizole may lead to falsely low results. Testing should be performed immediately prior to Metamizole dosing.       HgbA1c:  Hemoglobin A1C   Date Value Ref Range Status   03/23/2024 5.0 See below % Final       The ASCVD Risk score (Loan PEREIRA, et al., 2019) failed to calculate for the following reasons:    The valid total cholesterol range is 130 to 320 mg/dL    ASSESSMENT / PLAN:  We discussed mpoxx which he does not feel he needs at this time; same with doxy pep.   He plans to get Covid shot in the fall.  We discussed safety In the heat.  We talked about watching the salt In your diet - and will see you again in 4 months to see if we need to add a BP med.     Problem List Items Addressed This Visit       Human immunodeficiency virus infection (Multi)    Relevant Orders    Renal Function Panel    CBC and Auto Differential    CD4/8 Panel    HIV RNA, quantitative, PCR     Other Visit Diagnoses       Personal history of exposure to potentially hazardous body fluids            Thank you for coming in to see us today.   You look great!.   Please continue to keep a blood pressure log and we will check it in four months when you come back in.  If the numbers get high, call and let us know - we can make a change if needed.  Be safe the rest of this summer.     Alma Dinero, ELVIS-CNP

## 2024-08-19 NOTE — LETTER
08/19/24    Austin Almendarez DO  [address]  [address]  [facility] [address]      Dear Dr. Austin Almendarez DO,    I am writing to confirm that your patient, Jet Pearl, received care in my office on 08/19/24. I have enclosed a summary of the care provided to Jet for your reference.    Please contact me with any questions you may have regarding the visit.    Sincerely,         Alma Dinero, APRN-CNP  [address]  [facility] [address]  [facility] [address] [phone]    CC: No Recipients

## 2024-08-20 ENCOUNTER — APPOINTMENT (OUTPATIENT)
Dept: IMMUNOLOGY | Facility: CLINIC | Age: 54
End: 2024-08-20
Payer: MEDICAID

## 2024-08-20 LAB
CD3+CD4+ CELLS # BLD: 0.62 X10E9/L
CD3+CD4+ CELLS # BLD: 617 /MM3
CD3+CD4+ CELLS NFR BLD: 33 %
CD3+CD4+ CELLS/CD3+CD8+ CLL BLD: 0.65 %
CD3+CD8+ CELLS # BLD: 0.95 X10E9/L
CD3+CD8+ CELLS NFR BLD: 51 %
HIV1 RNA # PLAS NAA DL=20: NOT DETECTED {COPIES}/ML
HIV1 RNA SPEC NAA+PROBE-LOG#: NORMAL {LOG_COPIES}/ML
LYMPHOCYTES # SPEC AUTO: 1.87 X10*3/UL

## 2024-08-26 DIAGNOSIS — B20 HIV INFECTION, UNSPECIFIED SYMPTOM STATUS (MULTI): ICD-10-CM

## 2024-08-27 DIAGNOSIS — N18.30 CHRONIC KIDNEY DISEASE, STAGE 3 UNSPECIFIED (MULTI): Primary | ICD-10-CM

## 2024-10-01 DIAGNOSIS — B20 HUMAN IMMUNODEFICIENCY VIRUS (MULTI): ICD-10-CM

## 2024-10-01 RX ORDER — ELVITEGRAVIR, COBICISTAT, EMTRICITABINE, AND TENOFOVIR ALAFENAMIDE 150; 150; 200; 10 MG/1; MG/1; MG/1; MG/1
1 TABLET ORAL DAILY
Qty: 30 TABLET | Refills: 5 | Status: SHIPPED | OUTPATIENT
Start: 2024-10-01

## 2024-10-06 ENCOUNTER — HOSPITAL ENCOUNTER (OUTPATIENT)
Dept: RADIOLOGY | Facility: HOSPITAL | Age: 54
Discharge: HOME | End: 2024-10-06
Payer: MEDICAID

## 2024-10-06 DIAGNOSIS — Z00.00 ROUTINE MEDICAL EXAM: ICD-10-CM

## 2024-10-06 PROCEDURE — 75571 CT HRT W/O DYE W/CA TEST: CPT

## 2024-10-28 DIAGNOSIS — H40.1122 PRIMARY OPEN ANGLE GLAUCOMA (POAG) OF LEFT EYE, MODERATE STAGE: Primary | ICD-10-CM

## 2024-10-28 RX ORDER — DORZOLAMIDE HYDROCHLORIDE AND TIMOLOL MALEATE 20; 5 MG/ML; MG/ML
1 SOLUTION/ DROPS OPHTHALMIC 2 TIMES DAILY
Qty: 10 ML | Refills: 0 | Status: SHIPPED | OUTPATIENT
Start: 2024-10-28

## 2024-11-16 NOTE — PROGRESS NOTES
Patient is a 54 y.o. male presenting with history of kidney stones    SUBJECTIVE:  HPI   He has history of kidney stones. He feels well and is asymptomatic. He is having a hard time keeping up with 4 ounces of lemon juice. He is hydrating appropriately.    Urine Culture (no units)   Date Value   03/22/2024 No growth        Past Medical History:   Diagnosis Date    Bowel perforation (Multi) 05/2022    Diabetes mellitus, type 2 (Multi)     History of syphilis     HIV (human immunodeficiency virus infection)     Hypertension       Past Surgical History:   Procedure Laterality Date    HEMICOLECTOMY Right 05/07/2022    Right hemicolectomy with anastomosis and diverting loop ileostomy, open drainage of pelvic abscess and open cholecystectomy    ILEOSTOMY CLOSURE  10/21/2022    Closure of ileostomy with small bowel resection and anastomosis      Family History   Problem Relation Name Age of Onset    Colon cancer Mother Valerie Pearl     Cancer Mother Valerie Pearl     Cancer Maternal Grandfather Franklyn Moctezuma     Stroke Paternal Grandmother Sharon Pearl       Social History     Socioeconomic History    Marital status:    Tobacco Use    Smoking status: Never    Smokeless tobacco: Never   Vaping Use    Vaping status: Never Used   Substance and Sexual Activity    Alcohol use: Never    Drug use: Yes     Types: Marijuana     Comment: Use cannabis, not marijuana, which is racist terminolog    Sexual activity: Yes     Partners: Male     Birth control/protection: None     Social Drivers of Health     Financial Resource Strain: Low Risk  (3/22/2024)    Overall Financial Resource Strain (CARDIA)     Difficulty of Paying Living Expenses: Not hard at all   Food Insecurity: Not on File (9/26/2024)    Received from [employer]    Food Insecurity     Food: 0   Transportation Needs: No Transportation Needs (3/22/2024)    PRAPARE - Transportation     Lack of Transportation (Medical): No     Lack of Transportation (Non-Medical): No    Physical Activity: Inactive (3/22/2024)    Exercise Vital Sign     Days of Exercise per Week: 0 days     Minutes of Exercise per Session: 0 min   Stress: No Stress Concern Present (3/22/2024)    [employer] [place] [employer] - Occupational Stress Questionnaire     Feeling of Stress : Not at all   Social Connections: Socially Isolated (3/22/2024)    Social Connection and Isolation Panel [NHANES]     Frequency of Communication with Friends and Family: Never     Frequency of Social Gatherings with Friends and Family: Never     Attends Yarsanism Services: Never     Active Member of Clubs or Organizations: No     Attends Club or Organization Meetings: Never     Marital Status:    Intimate Partner Violence: Not At Risk (3/22/2024)    Humiliation, Afraid, Rape, and Kick questionnaire     Fear of Current or Ex-Partner: No     Emotionally Abused: No     Physically Abused: No     Sexually Abused: No   Housing Stability: Low Risk  (3/22/2024)    Housing Stability Vital Sign     Unable to Pay for Housing in the Last Year: No     Number of Places Lived in the Last Year: 1     Unstable Housing in the Last Year: No        OBJECTIVE:  There were no vitals taken for this visit.  Physical Exam   Constitutional: No obvious distress.  Eyes: Non-injected conjunctiva, sclera clear, EOMI.  Ears/Nose/Mouth/Throat: No obvious drainage per ears or nose.  Cardiovascular: Extremities are warm and well perfused. No edema, cyanosis or pallor.  Respiratory: No audible wheezing/stridor; respirations do not appear labored.  Gastrointestinal: Abdomen soft, not distended.  Musculoskeletal: Normal ROM of extremities.  Skin: No obvious rashes or open sores.  Neurologic: Alert and oriented, CN 2-12 grossly intact.  Psychiatric: Answers questions appropriately with normal affect.  Hematologic/Lymphatic/Immunologic: No obvious bruises or sites of spontaneous bleeding.  Genitourinary: No CVA tenderness, bladder not palpable.  "    Labs:  Lab Results   Component Value Date    WBC 5.4 08/19/2024    HGB 12.4 (L) 08/19/2024    HCT 39.2 (L) 08/19/2024     08/19/2024    CHOL 126 07/08/2024    TRIG 106 07/08/2024    HDL 40.9 07/08/2024    ALT 13 04/15/2024    AST 15 04/15/2024     08/19/2024    K 4.3 08/19/2024     08/19/2024    CREATININE 1.38 (H) 08/19/2024    BUN 17 08/19/2024    CO2 30 08/19/2024    TSH 0.83 07/08/2024    PSA 0.34 04/24/2024    INR 1.4 (H) 05/07/2022    HGBA1C 5.0 03/23/2024     No results found for: \"KPSAT\", \"KPSAP\"  IMAGING:      PROCEDURES:    ASSESSMENT/PLAN:  Problem List Items Addressed This Visit       Urinary symptom or sign    Relevant Orders    POCT UA Automated manually resulted (Completed)    Kidney stones    Prostate cancer screening - Primary      He has history of kidney stones. He was treated with ureteroscopy and laser lithotripsy in March 2024. The 24-hour urine April 2024 showed low citrate at 131 and low volume at 1.1 L, elevated calcium oxalate and uric acid. He will continue to keep up with hydration. He is not bothered by symptoms at this time. He will follow up in 6 months with KUB done 1 week prior.    He has been followed for prostate cancer screening. His last PSA was 0.34 in April 2024.  He will follow up in 6 months for PSA.    All questions were answered to the patient’s satisfaction.  Patient agrees with the plan and wishes to proceed.  Follow-up will be scheduled appropriately.     Scribe Attestation  By signing my name below, I, Joseph Sen,   attest that this documentation has been prepared under the direction and in the presence of Maxx Somers MD.     "

## 2024-11-18 ENCOUNTER — APPOINTMENT (OUTPATIENT)
Dept: UROLOGY | Facility: CLINIC | Age: 54
End: 2024-11-18
Payer: MEDICAID

## 2024-11-18 ENCOUNTER — HOSPITAL ENCOUNTER (OUTPATIENT)
Dept: RADIOLOGY | Facility: HOSPITAL | Age: 54
Discharge: HOME | End: 2024-11-18
Payer: MEDICAID

## 2024-11-18 DIAGNOSIS — N20.0 CALCULUS OF KIDNEY: ICD-10-CM

## 2024-11-18 DIAGNOSIS — R39.9 URINARY SYMPTOM OR SIGN: ICD-10-CM

## 2024-11-18 DIAGNOSIS — Z12.5 PROSTATE CANCER SCREENING: Primary | ICD-10-CM

## 2024-11-18 DIAGNOSIS — N20.0 KIDNEY STONES: ICD-10-CM

## 2024-11-18 LAB
POC APPEARANCE, URINE: CLEAR
POC BILIRUBIN, URINE: ABNORMAL
POC BLOOD, URINE: NEGATIVE
POC COLOR, URINE: YELLOW
POC GLUCOSE, URINE: ABNORMAL MG/DL
POC KETONES, URINE: NEGATIVE MG/DL
POC LEUKOCYTES, URINE: NEGATIVE
POC NITRITE,URINE: NEGATIVE
POC PH, URINE: 5.5 PH
POC PROTEIN, URINE: ABNORMAL MG/DL
POC SPECIFIC GRAVITY, URINE: 1.02
POC UROBILINOGEN, URINE: 0.2 EU/DL

## 2024-11-18 PROCEDURE — 74018 RADEX ABDOMEN 1 VIEW: CPT | Performed by: RADIOLOGY

## 2024-11-18 PROCEDURE — 81003 URINALYSIS AUTO W/O SCOPE: CPT | Performed by: UROLOGY

## 2024-11-18 PROCEDURE — 99214 OFFICE O/P EST MOD 30 MIN: CPT | Performed by: UROLOGY

## 2024-11-18 PROCEDURE — 74018 RADEX ABDOMEN 1 VIEW: CPT

## 2024-11-18 PROCEDURE — 1036F TOBACCO NON-USER: CPT | Performed by: UROLOGY

## 2024-11-18 ASSESSMENT — PAIN SCALES - GENERAL: PAINLEVEL_OUTOF10: 0-NO PAIN

## 2024-11-18 NOTE — PATIENT INSTRUCTIONS
Your provider ordered an xray, called a KUB, you may walk into any  Radiology Facility to have this performed or you may schedule at [phone]

## 2024-11-20 ENCOUNTER — APPOINTMENT (OUTPATIENT)
Dept: OPHTHALMOLOGY | Facility: CLINIC | Age: 54
End: 2024-11-20
Payer: MEDICAID

## 2024-11-22 ENCOUNTER — APPOINTMENT (OUTPATIENT)
Dept: OPHTHALMOLOGY | Facility: CLINIC | Age: 54
End: 2024-11-22
Payer: MEDICAID

## 2024-11-22 DIAGNOSIS — H40.1122 PRIMARY OPEN ANGLE GLAUCOMA (POAG) OF LEFT EYE, MODERATE STAGE: Primary | ICD-10-CM

## 2024-11-22 DIAGNOSIS — H40.1111 PRIMARY OPEN ANGLE GLAUCOMA (POAG) OF RIGHT EYE, MILD STAGE: ICD-10-CM

## 2024-11-22 DIAGNOSIS — H25.813 COMBINED FORMS OF AGE-RELATED CATARACT OF BOTH EYES: ICD-10-CM

## 2024-11-22 PROCEDURE — 99213 OFFICE O/P EST LOW 20 MIN: CPT | Performed by: OPHTHALMOLOGY

## 2024-11-22 PROCEDURE — 76514 ECHO EXAM OF EYE THICKNESS: CPT | Performed by: OPHTHALMOLOGY

## 2024-11-22 PROCEDURE — 92083 EXTENDED VISUAL FIELD XM: CPT | Performed by: OPHTHALMOLOGY

## 2024-11-22 RX ORDER — DORZOLAMIDE HYDROCHLORIDE AND TIMOLOL MALEATE 20; 5 MG/ML; MG/ML
1 SOLUTION/ DROPS OPHTHALMIC 2 TIMES DAILY
Qty: 30 ML | Refills: 1 | Status: SHIPPED | OUTPATIENT
Start: 2024-11-22 | End: 2024-12-22

## 2024-11-22 RX ORDER — LATANOPROST 50 UG/ML
1 SOLUTION/ DROPS OPHTHALMIC NIGHTLY
Qty: 7.5 ML | Refills: 1 | Status: SHIPPED | OUTPATIENT
Start: 2024-11-22

## 2024-11-22 ASSESSMENT — EXTERNAL EXAM - RIGHT EYE: OD_EXAM: NORMAL

## 2024-11-22 ASSESSMENT — REFRACTION_WEARINGRX
OD_SPHERE: -3.25
SPECS_TYPE: PAL
OD_AXIS: 130
OD_CYLINDER: -1.25
OS_AXIS: 050
OS_CYLINDER: -1.75
OS_ADD: +2.50
OS_SPHERE: -3.50
OD_ADD: +2.50

## 2024-11-22 ASSESSMENT — PATIENT HEALTH QUESTIONNAIRE - PHQ9
SUM OF ALL RESPONSES TO PHQ9 QUESTIONS 1 AND 2: 0
1. LITTLE INTEREST OR PLEASURE IN DOING THINGS: NOT AT ALL
2. FEELING DOWN, DEPRESSED OR HOPELESS: NOT AT ALL

## 2024-11-22 ASSESSMENT — VISUAL ACUITY
CORRECTION_TYPE: GLASSES
OD_CC: 20/25
OS_CC: 20/30
METHOD: SNELLEN - LINEAR
OD_CC+: -2
OS_CC+: -1

## 2024-11-22 ASSESSMENT — PACHYMETRY
OD_CT(UM): 611
OS_CT(UM): 606

## 2024-11-22 ASSESSMENT — ENCOUNTER SYMPTOMS
CARDIOVASCULAR NEGATIVE: 0
ALLERGIC/IMMUNOLOGIC NEGATIVE: 0
MUSCULOSKELETAL NEGATIVE: 0
PSYCHIATRIC NEGATIVE: 0
CONSTITUTIONAL NEGATIVE: 0
NEUROLOGICAL NEGATIVE: 0
EYES NEGATIVE: 0
HEMATOLOGIC/LYMPHATIC NEGATIVE: 0
ENDOCRINE NEGATIVE: 0
GASTROINTESTINAL NEGATIVE: 0
RESPIRATORY NEGATIVE: 0

## 2024-11-22 ASSESSMENT — EXTERNAL EXAM - LEFT EYE: OS_EXAM: NORMAL

## 2024-11-22 ASSESSMENT — PAIN SCALES - GENERAL: PAINLEVEL_OUTOF10: 0-NO PAIN

## 2024-11-22 ASSESSMENT — TONOMETRY
OS_IOP_MMHG: 13
OD_IOP_MMHG: 14
IOP_METHOD: GOLDMANN APPLANATION

## 2024-11-22 NOTE — PROGRESS NOTES
Subjective   Patient ID: Jet Pearl is a 54 y.o. male.    Chief Complaint    Follow-up       HPI    A little worsening in night driving and glare    Glaucoma Segovia visual field (HVF) and intraocular pressure (IOP).  No recent changes in health history or meds.  Vision is stable.  Using latan ou at bedtime, cosopt os bid, and romain od every day.    Last edited by Jez Faustin MD on 11/22/2024 11:24 AM.        Current Outpatient Medications (Ophthalmology pharm classes)   Medication Sig Dispense Refill    dorzolamide-timoloL (Cosopt) 22.3-6.8 mg/mL ophthalmic solution Administer 1 drop into both eyes 2 times a day. 30 mL 1    latanoprost (Xalatan) 0.005 % ophthalmic solution Administer 1 drop into both eyes once daily at bedtime. 7.5 mL 1     Current Outpatient Medications (Other)   Medication Sig Dispense Refill    Genvoya [phone] mg tablet TAKE 1 TABLET BY MOUTH EVERY DAY WITH FOOD. 30 tablet 5    loperamide (Imodium) 2 mg capsule Take 1 capsule (2 mg) by mouth 4 times a day as needed.      miscellaneous medical supply (Blood Pressure Cuff) misc Monitor blood pressure 3-4 times weekly 1 each 0    multivitamin with minerals (MULTIPLE VITAMIN-MINERALS ORAL) as directed Orally         Objective   Base Eye Exam       Visual Acuity (Snellen - Linear)         Right Left    Dist cc 20/25 -2 20/30 -1      Correction: Glasses              Tonometry (Goldmann Applanation, 11:29 AM)         Right Left    Pressure 14 13              Pachymetry (11/22/2024)         Right Left    Thickness 611 606              Pupils         Dark Shape React APD    Right 5 Round 3 None    Left 5 Round 3 None              Extraocular Movement         Right Left     Full Full                  Slit Lamp and Fundus Exam       External Exam         Right Left    External Normal Normal              Slit Lamp Exam         Right Left    Lids/Lashes 1+ Dermatochalasis - upper lid, 1+ Blepharitis 1+ Dermatochalasis - upper lid, 1+  Blepharitis    Conjunctiva/Sclera White and quiet White and quiet    Cornea Clear Clear    Anterior Chamber Deep and quiet Deep and quiet    Iris Round and reactive Inferior Nevus    Lens 2+ Nuclear sclerosis, 2+ Cortical cataract 2+ Nuclear sclerosis, 1+ Cortical cataract    Anterior Vitreous Vitreous syneresis Vitreous syneresis                  Refraction       Wearing Rx         Sphere Cylinder Axis Add    Right -3.25 -1.25 130 +2.50    Left -3.50 -1.75 050 +2.50      Type: PAL    Pupillary Distance: 63                    Assessment/Plan   Problem List Items Addressed This Visit          Eye/Vision problems    Primary open angle glaucoma (POAG) of left eye, moderate stage - Primary     Simplify to latan ou at bedtime and cosopt ou bid.  F/u 3 mos iop.  Can increase time between visits if all good at next appt.           Relevant Medications    dorzolamide-timoloL (Cosopt) 22.3-6.8 mg/mL ophthalmic solution    latanoprost (Xalatan) 0.005 % ophthalmic solution    Other Relevant Orders    Segovia Visual Field - OU - Both Eyes (Completed)    Primary open angle glaucoma (POAG) of right eye, mild stage    Combined forms of age-related cataract of both eyes

## 2024-11-22 NOTE — ASSESSMENT & PLAN NOTE
Simplify to latan ou at bedtime and cosopt ou bid.  F/u 3 mos iop.  Can increase time between visits if all good at next appt.

## 2024-12-16 ENCOUNTER — OFFICE VISIT (OUTPATIENT)
Dept: IMMUNOLOGY | Facility: CLINIC | Age: 54
End: 2024-12-16
Payer: MEDICAID

## 2024-12-16 VITALS
DIASTOLIC BLOOD PRESSURE: 83 MMHG | BODY MASS INDEX: 21.78 KG/M2 | WEIGHT: 160.6 LBS | SYSTOLIC BLOOD PRESSURE: 132 MMHG | HEART RATE: 75 BPM | OXYGEN SATURATION: 100 %

## 2024-12-16 DIAGNOSIS — Z77.21 PERSONAL HISTORY OF EXPOSURE TO POTENTIALLY HAZARDOUS BODY FLUIDS: ICD-10-CM

## 2024-12-16 DIAGNOSIS — N18.30 CHRONIC KIDNEY DISEASE, STAGE 3 UNSPECIFIED (MULTI): ICD-10-CM

## 2024-12-16 DIAGNOSIS — Z21 HIV INFECTION, UNSPECIFIED SYMPTOM STATUS: ICD-10-CM

## 2024-12-16 DIAGNOSIS — Z87.442 HISTORY OF KIDNEY STONES: ICD-10-CM

## 2024-12-16 DIAGNOSIS — Z12.5 PROSTATE CANCER SCREENING: ICD-10-CM

## 2024-12-16 LAB
BASOPHILS # BLD AUTO: 0.06 X10*3/UL (ref 0–0.1)
BASOPHILS NFR BLD AUTO: 0.9 %
EOSINOPHIL # BLD AUTO: 0.35 X10*3/UL (ref 0–0.7)
EOSINOPHIL NFR BLD AUTO: 5.2 %
ERYTHROCYTE [DISTWIDTH] IN BLOOD BY AUTOMATED COUNT: 14.6 % (ref 11.5–14.5)
HCT VFR BLD AUTO: 41.2 % (ref 41–52)
HGB BLD-MCNC: 13.3 G/DL (ref 13.5–17.5)
IMM GRANULOCYTES # BLD AUTO: 0.01 X10*3/UL (ref 0–0.7)
IMM GRANULOCYTES NFR BLD AUTO: 0.1 % (ref 0–0.9)
LYMPHOCYTES # BLD AUTO: 2.03 X10*3/UL (ref 1.2–4.8)
LYMPHOCYTES NFR BLD AUTO: 30.2 %
MCH RBC QN AUTO: 33.2 PG (ref 26–34)
MCHC RBC AUTO-ENTMCNC: 32.3 G/DL (ref 32–36)
MCV RBC AUTO: 103 FL (ref 80–100)
MONOCYTES # BLD AUTO: 0.64 X10*3/UL (ref 0.1–1)
MONOCYTES NFR BLD AUTO: 9.5 %
NEUTROPHILS # BLD AUTO: 3.64 X10*3/UL (ref 1.2–7.7)
NEUTROPHILS NFR BLD AUTO: 54.1 %
NRBC BLD-RTO: 0 /100 WBCS (ref 0–0)
PLATELET # BLD AUTO: 257 X10*3/UL (ref 150–450)
PSA SERPL-MCNC: 0.31 NG/ML
RBC # BLD AUTO: 4.01 X10*6/UL (ref 4.5–5.9)
WBC # BLD AUTO: 6.7 X10*3/UL (ref 4.4–11.3)

## 2024-12-16 PROCEDURE — 88185 FLOWCYTOMETRY/TC ADD-ON: CPT | Performed by: NURSE PRACTITIONER

## 2024-12-16 PROCEDURE — 36415 COLL VENOUS BLD VENIPUNCTURE: CPT | Performed by: NURSE PRACTITIONER

## 2024-12-16 PROCEDURE — 99214 OFFICE O/P EST MOD 30 MIN: CPT | Performed by: NURSE PRACTITIONER

## 2024-12-16 PROCEDURE — 4274F FLU IMMUNO ADMIND RCVD: CPT | Performed by: NURSE PRACTITIONER

## 2024-12-16 PROCEDURE — 87536 HIV-1 QUANT&REVRSE TRNSCRPJ: CPT | Performed by: NURSE PRACTITIONER

## 2024-12-16 PROCEDURE — 3079F DIAST BP 80-89 MM HG: CPT | Performed by: NURSE PRACTITIONER

## 2024-12-16 PROCEDURE — 84153 ASSAY OF PSA TOTAL: CPT | Performed by: NURSE PRACTITIONER

## 2024-12-16 PROCEDURE — 3075F SYST BP GE 130 - 139MM HG: CPT | Performed by: NURSE PRACTITIONER

## 2024-12-16 PROCEDURE — 1036F TOBACCO NON-USER: CPT | Performed by: NURSE PRACTITIONER

## 2024-12-16 PROCEDURE — 85025 COMPLETE CBC W/AUTO DIFF WBC: CPT | Performed by: NURSE PRACTITIONER

## 2024-12-16 ASSESSMENT — ENCOUNTER SYMPTOMS
ALLERGIC/IMMUNOLOGIC NEGATIVE: 1
HEMATOLOGIC/LYMPHATIC NEGATIVE: 1
CARDIOVASCULAR NEGATIVE: 1
CONSTITUTIONAL NEGATIVE: 1
NEUROLOGICAL NEGATIVE: 1
ENDOCRINE NEGATIVE: 1
RESPIRATORY NEGATIVE: 1

## 2024-12-16 NOTE — PROGRESS NOTES
HIV Clinic Follow-up Visit:    Jet Pearl was last seen in [facility] on 4/15/2024.    Missed antiretroviral doses in last 72 hours? No   Will miss an occasional dose.     Sexually active? yes, Partner/s aware of diagnosis? yes,     Condom use? Sometimes, Partner on PrEP? 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     Tobacco use: No     SUBJECTIVE:   58 YO male in for routine followup.  He feels mostly well.   Doing well.      Review of Systems  Review of Systems   Constitutional: Negative.    HENT:          He is still seeing [facility] dental - and just loves these folks - things they are so good.     Eyes:         He is seeing a new ophthalmologist.   Is taking eye drops and these have helped with the rings around the headlights at night time.    Respiratory: Negative.     Cardiovascular: Negative.    Gastrointestinal:         He generally has soft stools - from prior  surgeries.     Endocrine: Negative.    Genitourinary:         He does try to stay hydrated and watches the color of his urines.    Musculoskeletal:         Just 'normal' aches and pains    Skin: Negative.    Allergic/Immunologic: Negative.    Neurological: Negative.    Hematological: Negative.    Psychiatric/Behavioral:          Does admit to some depression and anxiety.   He took a break from mental health; but is not getting back into a mental health provider at [facility].      He would like a flu shot today.     CURRENT MEDICATIONS:    Current Outpatient Medications:     dorzolamide-timoloL (Cosopt) 22.3-6.8 mg/mL ophthalmic solution, Administer 1 drop into both eyes 2 times a day., Disp: 30 mL, Rfl: 1    Genvoya [phone] mg tablet, TAKE 1 TABLET BY MOUTH EVERY DAY WITH FOOD., Disp: 30 tablet, Rfl: 5    latanoprost (Xalatan) 0.005 % ophthalmic solution, Administer 1 drop into both eyes once daily at bedtime., Disp: 7.5 mL, Rfl: 1    loperamide (Imodium) 2 mg capsule, Take 1 capsule (2 mg) by mouth 4 times a day as needed., Disp: ,  Rfl:     multivitamin with minerals (MULTIPLE VITAMIN-MINERALS ORAL), as directed Orally, Disp: , Rfl:     miscellaneous medical supply (Blood Pressure Cuff) misc, Monitor blood pressure 3-4 times weekly, Disp: 1 each, Rfl: 0    PHYSICAL EXAMINATION:  Visit Vitals  /83   Pulse 75   Wt 72.8 kg (160 lb 9.6 oz)   SpO2 100%   BMI 21.78 kg/m²   Smoking Status Never   BSA 1.92 m²       Physical Exam   Physical Exam  Vitals reviewed.   Constitutional:       Appearance: Normal appearance.   HENT:      Head: Normocephalic and atraumatic.   Cardiovascular:      Rate and Rhythm: Normal rate and regular rhythm.      Heart sounds: Normal heart sounds.   Pulmonary:      Effort: Pulmonary effort is normal.      Breath sounds: Normal breath sounds.   Abdominal:      General: Bowel sounds are normal.      Palpations: Abdomen is soft.   Musculoskeletal:         General: Normal range of motion.      Cervical back: Normal range of motion and neck supple.   Skin:     General: Skin is warm and dry.      Capillary Refill: Capillary refill takes less than 2 seconds.   Neurological:      Mental Status: He is alert and oriented to person, place, and time.   Psychiatric:         Mood and Affect: Mood normal.         Behavior: Behavior normal.         PERTINENT DATA:  CBC:  WBC   Date Value Ref Range Status   08/19/2024 5.4 4.4 - 11.3 x10*3/uL Final     nRBC   Date Value Ref Range Status   08/19/2024 0.0 0.0 - 0.0 /100 WBCs Final     RBC   Date Value Ref Range Status   08/19/2024 3.81 (L) 4.50 - 5.90 x10*6/uL Final     Hemoglobin   Date Value Ref Range Status   08/19/2024 12.4 (L) 13.5 - 17.5 g/dL Final     MCV   Date Value Ref Range Status   08/19/2024 103 (H) 80 - 100 fL Final     RDW   Date Value Ref Range Status   08/19/2024 14.6 (H) 11.5 - 14.5 % Final       Renal Function Panel:  Glucose   Date Value Ref Range Status   08/19/2024 67 (L) 74 - 99 mg/dL Final     Sodium   Date Value Ref Range Status   08/19/2024 144 136 - 145 mmol/L  Final     Potassium   Date Value Ref Range Status   08/19/2024 4.3 3.5 - 5.3 mmol/L Final     Chloride   Date Value Ref Range Status   08/19/2024 107 98 - 107 mmol/L Final     HCO3, Arterial   Date Value Ref Range Status   05/09/2022 33.3 (H) 22 - 28 MMOL/L Final     Anion Gap   Date Value Ref Range Status   08/19/2024 11 10 - 20 mmol/L Final     Urea Nitrogen   Date Value Ref Range Status   08/19/2024 17 6 - 23 mg/dL Final     Creatinine   Date Value Ref Range Status   08/19/2024 1.38 (H) 0.50 - 1.30 mg/dL Final     eGFR   Date Value Ref Range Status   08/19/2024 60 (L) >60 mL/min/1.73m*2 Final     Comment:     Calculations of estimated GFR are performed using the 2021 CKD-EPI Study Refit equation without the race variable for the IDMS-Traceable creatinine methods.  https://jasn.asnjournals.org/content/early/2021/09/22/ASN.3527503976     Calcium   Date Value Ref Range Status   08/19/2024 9.0 8.6 - 10.6 mg/dL Final     Phosphorus   Date Value Ref Range Status   08/19/2024 4.0 2.5 - 4.9 mg/dL Final     Comment:     The performance characteristics of phosphorus testing in heparinized plasma have been validated by the individual  laboratory site where testing is performed. Testing on heparinized plasma is not approved by the FDA; however, such approval is not necessary.     Albumin   Date Value Ref Range Status   08/19/2024 3.9 3.4 - 5.0 g/dL Final       Hepatic Panel:  Albumin   Date Value Ref Range Status   08/19/2024 3.9 3.4 - 5.0 g/dL Final     Bilirubin, Total   Date Value Ref Range Status   04/15/2024 0.4 0.0 - 1.2 mg/dL Final     Bilirubin, Direct   Date Value Ref Range Status   07/01/2022 0.3 (H) 0.0 - 0.2 MG/DL Final     Alkaline Phosphatase   Date Value Ref Range Status   04/15/2024 98 33 - 120 U/L Final     ALT   Date Value Ref Range Status   04/15/2024 13 10 - 52 U/L Final     Comment:     Patients treated with Sulfasalazine may generate falsely decreased results for ALT.       HIV Viral Load:  HIV-1 RNA  PCR Viral Load Log   Date Value Ref Range Status   08/19/2024   Final     Comment:     Not calculated     HIV RNA Result   Date Value Ref Range Status   08/19/2024 Not Detected Not Detected Final       CD4 Count:  CD3+CD4+%   Date Value Ref Range Status   08/19/2024 33 29 - 57 % Final     CD3+CD4+ Absolute   Date Value Ref Range Status   08/19/2024 0.617 0.350 - 2.740 x10E9/L Final     CD3+CD8+%   Date Value Ref Range Status   08/19/2024 51 (H) 7 - 31 % Final     CD3+CD8+ Absolute   Date Value Ref Range Status   08/19/2024 0.954 0.080 - 1.490 x10E9/L Final     CD4/CD8 Ratio   Date Value Ref Range Status   08/19/2024 0.65 (L) 1.00 - 2.60 Final     CD45%   Date Value Ref Range Status   04/04/2023 100 % Final       CRCL:  Creatinine, Urine Random   Date Value Ref Range Status   03/22/2024 172.9 NOT ESTABLISHED mg/dL Final       Lipid Panel:  HDL-Cholesterol   Date Value Ref Range Status   07/08/2024 40.9 mg/dL Final     Comment:       Age       Very Low   Low     Normal    High    0-19 Y    < 35      < 40     40-45     ----  20-24 Y    ----     < 40      >45      ----        >24 Y      ----     < 40     40-60      >60       Cholesterol/HDL Ratio   Date Value Ref Range Status   07/08/2024 3.1  Final     Comment:       Ref Values  Desirable  < 3.4  High Risk  > 5.0     LDL   Date Value Ref Range Status   04/04/2023 55 0 - 99 mg/dL Final     Comment:     .                           NEAR      BORD      AGE      DESIRABLE  OPTIMAL    HIGH     HIGH     VERY HIGH     0-19 Y     0 - 109     ---    110-129   >/= 130     ----    20-24 Y     0 - 119     ---    120-159   >/= 160     ----      >24 Y     0 -  99   100-129  130-159   160-189     >/=190  .       VLDL   Date Value Ref Range Status   07/08/2024 21 0 - 40 mg/dL Final     Triglycerides   Date Value Ref Range Status   07/08/2024 106 0 - 149 mg/dL Final     Comment:        Age         Desirable   Borderline High   High     Very High   0 D-90 D    19 - 174         ----          ----        ----  91 D- 9 Y     0 -  74        75 -  99     >/= 100      ----    10-19 Y     0 -  89        90 - 129     >/= 130      ----    20-24 Y     0 - 114       115 - 149     >/= 150      ----         >24 Y     0 - 149       150 - 199    200- 499    >/= 500    Venipuncture immediately after or during the administration of Metamizole may lead to falsely low results. Testing should be performed immediately prior to Metamizole dosing.       HgbA1c:  Hemoglobin A1C   Date Value Ref Range Status   03/23/2024 5.0 See below % Final       The ASCVD Risk score (Indian DK, et al., 2019) failed to calculate for the following reasons:    The valid total cholesterol range is 130 to 320 mg/dL    ASSESSMENT / PLAN:   We will get routine labs here today; and add one a couple that the renal folks would like.   He sees them next week.  Se discussed doxy pep but he doesn't feel the need for it.   He will let us know if he changes his mind.        Thanks for coming in to see us today.   Stay safe and healthy, stay hydrated, have great holidays.     Alma Dinero, APRN-CNP

## 2024-12-16 NOTE — LETTER
12/16/24    Austin Almendarez DO  [address]  [address]  [facility] [address]      Dear Dr. Austin Almendarez DO,    I am writing to confirm that your patient, Jet Pearl, received care in my office on 12/16/24. I have enclosed a summary of the care provided to Jet for your reference.    Please contact me with any questions you may have regarding the visit.    Sincerely,         Alma Dinero, APRN-CNP  [address]  [facility] [address]  [facility] [address] [phone]    CC: No Recipients

## 2024-12-17 LAB
CD3+CD4+ CELLS # BLD: 0.71 X10E9/L
CD3+CD4+ CELLS # BLD: 711 /MM3
CD3+CD4+ CELLS NFR BLD: 35 %
CD3+CD4+ CELLS/CD3+CD8+ CLL BLD: 0.63 %
CD3+CD8+ CELLS # BLD: 1.14 X10E9/L
CD3+CD8+ CELLS NFR BLD: 56 %
HIV1 RNA # PLAS NAA DL=20: NOT DETECTED {COPIES}/ML
HIV1 RNA SPEC NAA+PROBE-LOG#: NORMAL {LOG_COPIES}/ML
LYMPHOCYTES # SPEC AUTO: 2.03 X10*3/UL

## 2024-12-27 DIAGNOSIS — N18.30 CHRONIC KIDNEY DISEASE, STAGE 3 UNSPECIFIED (MULTI): Primary | ICD-10-CM

## 2025-02-24 ENCOUNTER — APPOINTMENT (OUTPATIENT)
Dept: OPHTHALMOLOGY | Facility: CLINIC | Age: 55
End: 2025-02-24
Payer: MEDICAID

## 2025-02-24 DIAGNOSIS — H40.1111 PRIMARY OPEN ANGLE GLAUCOMA (POAG) OF RIGHT EYE, MILD STAGE: ICD-10-CM

## 2025-02-24 DIAGNOSIS — H25.813 COMBINED FORMS OF AGE-RELATED CATARACT OF BOTH EYES: ICD-10-CM

## 2025-02-24 DIAGNOSIS — H40.1122 PRIMARY OPEN ANGLE GLAUCOMA (POAG) OF LEFT EYE, MODERATE STAGE: Primary | ICD-10-CM

## 2025-02-24 PROCEDURE — 99212 OFFICE O/P EST SF 10 MIN: CPT | Performed by: OPHTHALMOLOGY

## 2025-02-24 RX ORDER — DORZOLAMIDE HYDROCHLORIDE AND TIMOLOL MALEATE 20; 5 MG/ML; MG/ML
1 SOLUTION/ DROPS OPHTHALMIC 2 TIMES DAILY
Qty: 30 ML | Refills: 1 | Status: SHIPPED | OUTPATIENT
Start: 2025-02-24

## 2025-02-24 RX ORDER — DORZOLAMIDE HYDROCHLORIDE AND TIMOLOL MALEATE 20; 5 MG/ML; MG/ML
1 SOLUTION/ DROPS OPHTHALMIC 2 TIMES DAILY
COMMUNITY
Start: 2025-02-06 | End: 2025-02-24 | Stop reason: SDUPTHER

## 2025-02-24 RX ORDER — LATANOPROST 50 UG/ML
1 SOLUTION/ DROPS OPHTHALMIC NIGHTLY
Qty: 7.5 ML | Refills: 1 | Status: SHIPPED | OUTPATIENT
Start: 2025-02-24

## 2025-02-24 ASSESSMENT — EXTERNAL EXAM - RIGHT EYE: OD_EXAM: NORMAL

## 2025-02-24 ASSESSMENT — PAIN SCALES - GENERAL: PAINLEVEL_OUTOF10: 0-NO PAIN

## 2025-02-24 ASSESSMENT — PACHYMETRY
OD_CT(UM): 611
OS_CT(UM): 606

## 2025-02-24 ASSESSMENT — VISUAL ACUITY
OD_PH_CC+: -2
OD_CC+: -1
OS_PH_CC: 20/40
METHOD: SNELLEN - SINGLE
OS_CC: 20/70
OD_CC: 20/50
OS_PH_CC+: -1
OS_CC+: +1
OD_PH_CC: 20/25
CORRECTION_TYPE: GLASSES

## 2025-02-24 ASSESSMENT — ENCOUNTER SYMPTOMS
NEUROLOGICAL NEGATIVE: 0
MUSCULOSKELETAL NEGATIVE: 0
RESPIRATORY NEGATIVE: 0
ENDOCRINE NEGATIVE: 0
PSYCHIATRIC NEGATIVE: 0
EYES NEGATIVE: 0
HEMATOLOGIC/LYMPHATIC NEGATIVE: 0
CONSTITUTIONAL NEGATIVE: 0
ALLERGIC/IMMUNOLOGIC NEGATIVE: 0
GASTROINTESTINAL NEGATIVE: 0
CARDIOVASCULAR NEGATIVE: 0

## 2025-02-24 ASSESSMENT — REFRACTION_WEARINGRX
OD_SPHERE: -3.25
SPECS_TYPE: PAL
OS_ADD: +2.50
OD_AXIS: 125
OS_CYLINDER: -1.75
OS_AXIS: 047
OS_SPHERE: -3.50
OD_CYLINDER: -1.25
OD_ADD: +2.50

## 2025-02-24 ASSESSMENT — TONOMETRY
OS_IOP_MMHG: 17
OD_IOP_MMHG: 16
IOP_METHOD: GOLDMANN APPLANATION

## 2025-02-24 ASSESSMENT — EXTERNAL EXAM - LEFT EYE: OS_EXAM: NORMAL

## 2025-02-24 NOTE — ASSESSMENT & PLAN NOTE
Try to be more consistent and compliant with drops (gtts).  Cosopt ou bid and latan ou at bedtime.  F/u 4 mos full with oct nerves and mac.

## 2025-02-24 NOTE — PROGRESS NOTES
Subjective   Patient ID: Jet Pearl is a 54 y.o. male.    Chief Complaint    IOP check       HPI    Using Latanoprost and Cosopt as directed.    Glaucoma intraocular pressure (IOP) check.  Not quite using drops (gtts) as directed and missing drops (gtts) about 1-2 times per week.  Vision may be worse.  More stress reported today.  No other  changes in health history or meds.    Last edited by Jez Faustin MD on 2/24/2025  9:23 AM.        Current Outpatient Medications (Ophthalmology pharm classes)   Medication Sig Dispense Refill    dorzolamide-timoloL (Cosopt) 22.3-6.8 mg/mL ophthalmic solution Administer 1 drop into both eyes 2 times a day.      latanoprost (Xalatan) 0.005 % ophthalmic solution Administer 1 drop into both eyes once daily at bedtime. 7.5 mL 1     Current Outpatient Medications (Other)   Medication Sig Dispense Refill    Genvoya [phone] mg tablet TAKE 1 TABLET BY MOUTH EVERY DAY WITH FOOD. 30 tablet 5    loperamide (Imodium) 2 mg capsule Take 1 capsule (2 mg) by mouth 4 times a day as needed.      miscellaneous medical supply (Blood Pressure Cuff) misc Monitor blood pressure 3-4 times weekly 1 each 0    multivitamin with minerals (MULTIPLE VITAMIN-MINERALS ORAL) as directed Orally         Objective   Base Eye Exam       Visual Acuity (Snellen - Single)         Right Left    Dist cc 20/50 -1 20/70 +1    Dist ph cc 20/25 -2 20/40 -1      Correction: Glasses              Tonometry (Goldmann Applanation, 9:27 AM)         Right Left    Pressure 16 17              Pupils         Dark Shape React APD    Right 5 Round 2 None    Left 5 Round 2 None              Extraocular Movement         Right Left     Full Full                  Slit Lamp and Fundus Exam       External Exam         Right Left    External Normal Normal              Slit Lamp Exam         Right Left    Lids/Lashes 1+ Dermatochalasis - upper lid, 1+ Blepharitis 1+ Dermatochalasis - upper lid, 1+ Blepharitis    Conjunctiva/Sclera  White and quiet White and quiet    Cornea Clear Clear    Anterior Chamber Deep and quiet Deep and quiet    Iris Round and reactive Inferior Nevus    Lens 2+ Nuclear sclerosis, 2+ Cortical cataract 2+ Nuclear sclerosis, 1+ Cortical cataract    Anterior Vitreous Vitreous syneresis Vitreous syneresis                  Refraction       Wearing Rx         Sphere Cylinder Axis Add    Right -3.25 -1.25 125 +2.50    Left -3.50 -1.75 047 +2.50      Type: PAL                    Assessment/Plan   Problem List Items Addressed This Visit          Eye/Vision problems    Primary open angle glaucoma (POAG) of left eye, moderate stage - Primary     Try to be more consistent and compliant with drops (gtts).  Cosopt ou bid and latan ou at bedtime.  F/u 4 mos full with oct nerves and mac.           Primary open angle glaucoma (POAG) of right eye, mild stage    Combined forms of age-related cataract of both eyes

## 2025-02-27 ENCOUNTER — APPOINTMENT (OUTPATIENT)
Dept: OPHTHALMOLOGY | Facility: CLINIC | Age: 55
End: 2025-02-27
Payer: MEDICAID

## 2025-02-28 ENCOUNTER — APPOINTMENT (OUTPATIENT)
Dept: OPHTHALMOLOGY | Facility: CLINIC | Age: 55
End: 2025-02-28
Payer: MEDICAID

## 2025-05-12 DIAGNOSIS — Z12.5 PROSTATE CANCER SCREENING: ICD-10-CM

## 2025-05-14 ENCOUNTER — HOSPITAL ENCOUNTER (OUTPATIENT)
Dept: RADIOLOGY | Facility: HOSPITAL | Age: 55
Discharge: HOME | End: 2025-05-14
Payer: MEDICAID

## 2025-05-14 DIAGNOSIS — N20.0 KIDNEY STONES: ICD-10-CM

## 2025-05-14 LAB — PSA SERPL-MCNC: 0.33 NG/ML

## 2025-05-14 PROCEDURE — 74018 RADEX ABDOMEN 1 VIEW: CPT

## 2025-05-17 NOTE — PROGRESS NOTES
Patient is a 54 y.o. male presenting for followup with [facility] of kidney stones    SUBJECTIVE:  HPI   He presents for 6-month followup.  He passed a stone in 3/25, however, none since.  He is feeling well.  He has no other urinary complaints.     He has history of kidney stones. He feels well and is asymptomatic. He is having a hard time keeping up with 4 ounces of lemon juice. He is hydrating appropriately.    Medical History[1]  Surgical History[2]  Family History[3]  Social History[4]    Review of systems  All systems have been reviewed and are negative unless otherwise noted in the HPI.    OBJECTIVE:  There were no vitals taken for this visit.  Physical Exam   Constitutional: No obvious distress.  Eyes: Non-injected conjunctiva, sclera clear, EOMI.  Ears/Nose/Mouth/Throat: No obvious drainage per ears or nose.  Cardiovascular: Extremities are warm and well perfused. No edema, cyanosis or pallor.  Respiratory: No audible wheezing/stridor; respirations do not appear labored.  Gastrointestinal: Abdomen soft, not distended.  Musculoskeletal: Normal ROM of extremities.  Skin: No obvious rashes or open sores.  Neurologic: Alert and oriented, CN 2-12 grossly intact.  Psychiatric: Answers questions appropriately with normal affect.  Hematologic/Lymphatic/Immunologic: No obvious bruises or sites of spontaneous bleeding.  Genitourinary: No CVA tenderness, bladder not palpable.     Labs:  Lab Results   Component Value Date    WBC 6.7 12/16/2024    HGB 13.3 (L) 12/16/2024    HCT 41.2 12/16/2024     (H) 12/16/2024     12/16/2024    GLUCOSE 67 (L) 08/19/2024    CALCIUM 9.0 08/19/2024     08/19/2024    K 4.3 08/19/2024    CO2 30 08/19/2024     08/19/2024    BUN 17 08/19/2024    CREATININE 1.38 (H) 08/19/2024    EGFR 60 (L) 08/19/2024    PTH 30.2 02/12/2024    URICACID 4.4 04/24/2024    PSA 0.33 05/14/2025    URINECULTURE No growth 03/22/2024     IMAGING:  === 05/14/25 ===  XR ABDOMEN 1  VIEW  FINDINGS:  -Evaluation for nephroliths is limited due to projection of bowel gas  over the shadows of the kidneys.  -No radiopaque densities projecting over the expected shadows of the  bilateral kidneys or expected courses of the bilateral ureters.   -Bowel gas pattern is nonspecific and nonobstructive.      PROCEDURES:  Bladder scan: PVR:  0 mL  5/19/25    ASSESSMENT/PLAN:  Problem List Items Addressed This Visit       Urinary symptom or sign    Relevant Orders    Measure post void residual (Completed)    POCT UA Automated manually resulted (Completed)    Kidney stones - Primary    Relevant Orders    XR abdomen 1 view    Prostate cancer screening    Relevant Orders    PSA     He has history of kidney stones. He was treated with ureteroscopy and laser lithotripsy in March 2024. KUB from 5/14/25 was viewed. There does not appear to be any obvious stones.     For stone prevention, the 24-hour urine April 2024 showed low citrate at 131 and low volume at 1.1 L, elevated calcium oxalate and uric acid. He will increase hydration to a goal urine output of 2.5 L per day.   PTH (2/24) and uric acid (4/24) levels were normal.    He has been followed for prostate cancer screening.   PSA summary  05/14/25 0.33  12/16/24 0.31  04/24/24 0.34  07/11/22 0.3    Urinalysis was negative today.     RTC in 1 year with KUB and PSA prior.    All questions were answered to the patient’s satisfaction.  Patient agrees with the plan and wishes to proceed.  Follow-up will be scheduled appropriately.     Scribe Attestation  By signing my name below, I, Joseph Sen,   attest that this documentation has been prepared under the direction and in the presence of Maxx Somers MD.          [1]   Past Medical History:  Diagnosis Date    Bowel perforation (Multi) 05/2022    Chronic kidney disease 2022    Diabetes mellitus, type 2 (Multi)     History of syphilis     HIV (human immunodeficiency virus infection)     Hypertension     [2]   Past Surgical History:  Procedure Laterality Date    HEMICOLECTOMY Right 05/07/2022    Right hemicolectomy with anastomosis and diverting loop ileostomy, open drainage of pelvic abscess and open cholecystectomy    ILEOSTOMY CLOSURE  10/21/2022    Closure of ileostomy with small bowel resection and anastomosis   [3]   Family History  Problem Relation Name Age of Onset    Colon cancer Mother Valerie Pierce     Cancer Mother Valerie Pierce     Arthritis Mother Valerie Pierce     Cancer Maternal Grandfather Franklyn Moctezuma     Stroke Paternal Grandmother Sharon Pearl     Alcohol abuse Father Shaw Pearl    [4]   Social History  Tobacco Use    Smoking status: Never    Smokeless tobacco: Never   Vaping Use    Vaping status: Never Used   Substance Use Topics    Alcohol use: Never    Drug use: Yes     Types: Marijuana     Comment: Use cannabis, not marijuana

## 2025-05-19 ENCOUNTER — APPOINTMENT (OUTPATIENT)
Dept: UROLOGY | Facility: CLINIC | Age: 55
End: 2025-05-19
Payer: MEDICAID

## 2025-05-19 DIAGNOSIS — R39.9 URINARY SYMPTOM OR SIGN: ICD-10-CM

## 2025-05-19 DIAGNOSIS — Z12.5 PROSTATE CANCER SCREENING: ICD-10-CM

## 2025-05-19 DIAGNOSIS — N20.0 KIDNEY STONES: Primary | ICD-10-CM

## 2025-05-19 LAB
POC APPEARANCE, URINE: CLEAR
POC BILIRUBIN, URINE: NEGATIVE
POC BLOOD, URINE: NEGATIVE
POC COLOR, URINE: YELLOW
POC GLUCOSE, URINE: NEGATIVE MG/DL
POC KETONES, URINE: NEGATIVE MG/DL
POC LEUKOCYTES, URINE: NEGATIVE
POC NITRITE,URINE: NEGATIVE
POC PH, URINE: 5.5 PH
POC PROTEIN, URINE: NEGATIVE MG/DL
POC SPECIFIC GRAVITY, URINE: >=1.03
POC UROBILINOGEN, URINE: 0.2 EU/DL

## 2025-05-19 PROCEDURE — 1036F TOBACCO NON-USER: CPT | Performed by: UROLOGY

## 2025-05-19 PROCEDURE — 81003 URINALYSIS AUTO W/O SCOPE: CPT | Performed by: UROLOGY

## 2025-05-19 PROCEDURE — 99214 OFFICE O/P EST MOD 30 MIN: CPT | Performed by: UROLOGY

## 2025-05-19 ASSESSMENT — PAIN SCALES - GENERAL: PAINLEVEL_OUTOF10: 0-NO PAIN

## 2025-06-08 PROBLEM — H52.7 REFRACTIVE ERROR: Status: RESOLVED | Noted: 2024-05-22 | Resolved: 2025-06-08

## 2025-06-08 PROBLEM — Z91.89 GLAUCOMA HIGH RISK: Status: RESOLVED | Noted: 2023-08-22 | Resolved: 2025-06-08

## 2025-06-09 ENCOUNTER — APPOINTMENT (OUTPATIENT)
Dept: OPHTHALMOLOGY | Facility: CLINIC | Age: 55
End: 2025-06-09
Payer: MEDICAID

## 2025-06-09 DIAGNOSIS — H52.223 MYOPIA OF BOTH EYES WITH REGULAR ASTIGMATISM AND PRESBYOPIA: ICD-10-CM

## 2025-06-09 DIAGNOSIS — H40.1122 PRIMARY OPEN ANGLE GLAUCOMA (POAG) OF LEFT EYE, MODERATE STAGE: Primary | ICD-10-CM

## 2025-06-09 DIAGNOSIS — H35.372 EPIRETINAL MEMBRANE (ERM) OF LEFT EYE: ICD-10-CM

## 2025-06-09 DIAGNOSIS — H25.813 COMBINED FORMS OF AGE-RELATED CATARACT OF BOTH EYES: ICD-10-CM

## 2025-06-09 DIAGNOSIS — H40.1111 PRIMARY OPEN ANGLE GLAUCOMA (POAG) OF RIGHT EYE, MILD STAGE: ICD-10-CM

## 2025-06-09 DIAGNOSIS — H25.13 AGE-RELATED NUCLEAR CATARACT OF BOTH EYES: ICD-10-CM

## 2025-06-09 DIAGNOSIS — H52.13 MYOPIA OF BOTH EYES WITH REGULAR ASTIGMATISM AND PRESBYOPIA: ICD-10-CM

## 2025-06-09 DIAGNOSIS — H52.4 MYOPIA OF BOTH EYES WITH REGULAR ASTIGMATISM AND PRESBYOPIA: ICD-10-CM

## 2025-06-09 PROCEDURE — 92004 COMPRE OPH EXAM NEW PT 1/>: CPT | Performed by: STUDENT IN AN ORGANIZED HEALTH CARE EDUCATION/TRAINING PROGRAM

## 2025-06-09 PROCEDURE — 92133 CPTRZD OPH DX IMG PST SGM ON: CPT | Performed by: STUDENT IN AN ORGANIZED HEALTH CARE EDUCATION/TRAINING PROGRAM

## 2025-06-09 RX ORDER — DORZOLAMIDE HYDROCHLORIDE AND TIMOLOL MALEATE 20; 5 MG/ML; MG/ML
1 SOLUTION/ DROPS OPHTHALMIC 2 TIMES DAILY
Qty: 30 ML | Refills: 3 | Status: SHIPPED | OUTPATIENT
Start: 2025-06-09 | End: 2025-09-07

## 2025-06-09 RX ORDER — LATANOPROST 50 UG/ML
1 SOLUTION/ DROPS OPHTHALMIC NIGHTLY
Qty: 7.5 ML | Refills: 3 | Status: SHIPPED | OUTPATIENT
Start: 2025-06-09 | End: 2025-09-07

## 2025-06-09 ASSESSMENT — REFRACTION_MANIFEST
OD_CYLINDER: +1.25
OD_AXIS: 060
OS_SPHERE: -5.75
OS_CYLINDER: +1.50
OD_ADD: +2.50
OD_SPHERE: -6.25
OS_ADD: +2.50
OS_AXIS: 140

## 2025-06-09 ASSESSMENT — CUP TO DISC RATIO
OD_RATIO: 0.4
OS_RATIO: 0.8

## 2025-06-09 ASSESSMENT — REFRACTION_WEARINGRX
OS_AXIS: 140
OD_AXIS: 040
OS_ADD: +2.50
OD_ADD: +2.50
SPECS_TYPE: PROGRESSIVE
OD_CYLINDER: +1.25
OS_CYLINDER: +1.75
OD_SPHERE: -4.50
OS_SPHERE: -5.25

## 2025-06-09 ASSESSMENT — ENCOUNTER SYMPTOMS
HEMATOLOGIC/LYMPHATIC NEGATIVE: 0
MUSCULOSKELETAL NEGATIVE: 0
PSYCHIATRIC NEGATIVE: 0
RESPIRATORY NEGATIVE: 0
ENDOCRINE NEGATIVE: 0
EYES NEGATIVE: 0
CARDIOVASCULAR NEGATIVE: 0
NEUROLOGICAL NEGATIVE: 0
GASTROINTESTINAL NEGATIVE: 0
ALLERGIC/IMMUNOLOGIC NEGATIVE: 0
CONSTITUTIONAL NEGATIVE: 0

## 2025-06-09 ASSESSMENT — TONOMETRY
OD_IOP_MMHG: 13
IOP_METHOD: GOLDMANN APPLANATION
OS_IOP_MMHG: 13

## 2025-06-09 ASSESSMENT — EXTERNAL EXAM - LEFT EYE: OS_EXAM: NORMAL

## 2025-06-09 ASSESSMENT — EXTERNAL EXAM - RIGHT EYE: OD_EXAM: NORMAL

## 2025-06-09 ASSESSMENT — PACHYMETRY
OD_CT(UM): 611
OS_CT(UM): 606

## 2025-06-09 ASSESSMENT — VISUAL ACUITY
METHOD: SNELLEN - LINEAR
OS_PH_CC: 20/60+1
OS_CC: 20/125+1
OD_CC: 20/100-1
OD_PH_CC: 20/25

## 2025-06-09 NOTE — PROGRESS NOTES
Assessment/Plan   Diagnoses and all orders for this visit:  Primary open angle glaucoma (POAG) of left eye, moderate stage  Primary open angle glaucoma (POAG) of right eye, mild stage  -IOP today 13/13 c TMAX per chart review 16/17 c PACHS 611/606  -patient was previously managed with Dr. Faustin  -current TXT: Latanoprost QHS OU and Cosopt BID OU  -ONH appearance with significant NRR thinning OS>>OD  -OCT RNFL today 6/9/25 Significant thinning OS>OD-no prior scans in [facility] to compare to  -Last visual field on record was a 30-2 from 11/2024 OD: IN defects OS: dense inferior arcuate; SN defects  -Pt ed on exam findings-with IOP today at 13 bilateral will continue with current TXT  Epiretinal membrane (ERM) of left eye  -noted on DFE and MAC OCT today   -does not appear visually significant   Combined forms of age-related cataract of both eyes  Myopia of both eyes with regular astigmatism and presbyopia  -cataracts appear to be visually significant   -patient has been noticing a gradual decline in vision bilateral  -BCVA on MRX OD 20/40 OS 20/50   -DFE and OCT MAC within normal limits today  -will have patient f/u with Dr. Bettencourt for second opinion on visual significance of cataracts and monitoring glaucoma     RTC Dr. Bettencourt for second opinion on visual significance of cataracts and monitoring glaucoma 2 months

## 2025-06-09 NOTE — Clinical Note
Odell Cao, I would like to have this patient follow up with Dr. Bettencourt in a few months for a baseline glaucoma and cataract evaluation. Thanks!

## 2025-06-09 NOTE — PROGRESS NOTES
Pt messaged to cancel appointment. He will be receiving care with Dr Alfredo at [facility] in [facility].

## 2025-06-16 ENCOUNTER — APPOINTMENT (OUTPATIENT)
Dept: IMMUNOLOGY | Facility: CLINIC | Age: 55
End: 2025-06-16
Payer: MEDICAID

## 2025-07-21 NOTE — PROGRESS NOTES
7/22/2025 CC: 55 y.o. presents for glaucoma evaluation. Referred by Dr Alvarado    Past ocular history: POAG, glasses, ERM OS  Family history: no family history of glaucoma   Past medical history: HIV, HTN, BILLY , sepsis  Social history: denies tobacco     Eye medications:   Both eyes: Latanoprost qhs and Cosopt bid     Allergy:  NKDA     Testing:    HVF 24-2 7/22/2025: FO, OD- mild GD, MD -4.4. OS- mild GD, dense IN defect, MD -19.0 dB    OCT 6/9/2025: OD- thin I/S/T, avg 59 um. OS- thin I/S/T, avg 50 (small discs). Mac: OD- Regular macular contour, OS- ERM.    Pachymetry 7/22/2025: 611/606    Gonioscopy: open    Tmax: teens    Assessment:   Primary open angle glaucoma OS>OD  - Negative fhx  - Thick C  - Pale disc OS>OD  - Testing: OCT, small disc, Thin T OU, stable dense IN defect OS  Target IOP: teens/low- teens  - IOP 7/22/2025: 14 OU. Stable exam/VF. Refer to neuro-ophthalmology for non- glaucomatous optic neuropathy, pale disc, VF not correlated w/ OCT (thin T), h/o TBI > 5 yrs ago.     NS cataract  - bdl, monitor    ERM OS  RE  - FU Dr Alvarado  - Myopia, ast., presbyopia     Plan:   I explained my findings. POAG vs (non- glaucomatous optic neuropathy), stable.    Eye medications:   Both eyes: Continue Latanoprost qhs and Cosopt bid     Return in 6 mo, update testing   Refer to neuro-ophthalmology

## 2025-07-22 ENCOUNTER — APPOINTMENT (OUTPATIENT)
Dept: OPHTHALMOLOGY | Facility: CLINIC | Age: 55
End: 2025-07-22
Payer: MEDICAID

## 2025-07-22 DIAGNOSIS — H40.1122 PRIMARY OPEN ANGLE GLAUCOMA (POAG) OF LEFT EYE, MODERATE STAGE: ICD-10-CM

## 2025-07-22 DIAGNOSIS — H25.13 AGE-RELATED NUCLEAR CATARACT OF BOTH EYES: ICD-10-CM

## 2025-07-22 DIAGNOSIS — H40.1111 PRIMARY OPEN ANGLE GLAUCOMA (POAG) OF RIGHT EYE, MILD STAGE: Primary | ICD-10-CM

## 2025-07-22 DIAGNOSIS — H35.372 EPIRETINAL MEMBRANE (ERM) OF LEFT EYE: ICD-10-CM

## 2025-07-22 PROCEDURE — 92083 EXTENDED VISUAL FIELD XM: CPT | Performed by: OPHTHALMOLOGY

## 2025-07-22 PROCEDURE — 99214 OFFICE O/P EST MOD 30 MIN: CPT | Performed by: OPHTHALMOLOGY

## 2025-07-22 ASSESSMENT — VISUAL ACUITY
OD_CC+: -1
CORRECTION_TYPE: GLASSES
OS_PH_CC+: -2
OD_PH_CC+: +1
OD_CC: 20/50
OD_PH_CC: 20/30
OS_PH_CC: 20/60
OS_CC: 20/70
METHOD: SNELLEN - LINEAR
OS_CC+: -1

## 2025-07-22 ASSESSMENT — TONOMETRY
OD_IOP_MMHG: 14
OS_IOP_MMHG: 14
IOP_METHOD: GOLDMANN APPLANATION

## 2025-07-22 ASSESSMENT — PACHYMETRY
OD_CT(UM): 611
OS_CT(UM): 606

## 2025-07-22 ASSESSMENT — CUP TO DISC RATIO
OD_RATIO: 0.6
OS_RATIO: 0.7

## 2025-07-22 ASSESSMENT — CONF VISUAL FIELD
OS_NORMAL: 1
OD_SUPERIOR_TEMPORAL_RESTRICTION: 0
OS_INFERIOR_NASAL_RESTRICTION: 0
OD_INFERIOR_NASAL_RESTRICTION: 0
OS_INFERIOR_TEMPORAL_RESTRICTION: 0
OD_SUPERIOR_NASAL_RESTRICTION: 0
OS_SUPERIOR_NASAL_RESTRICTION: 0
OD_INFERIOR_TEMPORAL_RESTRICTION: 0
OS_SUPERIOR_TEMPORAL_RESTRICTION: 0
OD_NORMAL: 1

## 2025-07-22 ASSESSMENT — REFRACTION_WEARINGRX
OS_ADD: +2.50
OD_CYLINDER: +1.25
OD_ADD: +2.50
OD_SPHERE: -4.50
OD_AXIS: 040
SPECS_TYPE: PROGRESSIVE
OS_AXIS: 140
OS_CYLINDER: +1.75
OS_SPHERE: -5.25

## 2025-07-22 ASSESSMENT — ENCOUNTER SYMPTOMS
MUSCULOSKELETAL NEGATIVE: 0
NEUROLOGICAL NEGATIVE: 0
ALLERGIC/IMMUNOLOGIC NEGATIVE: 0
CARDIOVASCULAR NEGATIVE: 0
EYES NEGATIVE: 1
PSYCHIATRIC NEGATIVE: 0
GASTROINTESTINAL NEGATIVE: 0
RESPIRATORY NEGATIVE: 0
ENDOCRINE NEGATIVE: 0
CONSTITUTIONAL NEGATIVE: 0
HEMATOLOGIC/LYMPHATIC NEGATIVE: 0

## 2025-07-22 ASSESSMENT — EXTERNAL EXAM - LEFT EYE: OS_EXAM: NORMAL

## 2025-07-22 ASSESSMENT — EXTERNAL EXAM - RIGHT EYE: OD_EXAM: NORMAL

## 2025-11-25 ENCOUNTER — APPOINTMENT (OUTPATIENT)
Dept: OPHTHALMOLOGY | Facility: CLINIC | Age: 55
End: 2025-11-25
Payer: MEDICAID

## 2026-01-27 ENCOUNTER — APPOINTMENT (OUTPATIENT)
Dept: OPHTHALMOLOGY | Facility: CLINIC | Age: 56
End: 2026-01-27
Payer: MEDICAID

## 2026-06-01 ENCOUNTER — APPOINTMENT (OUTPATIENT)
Dept: UROLOGY | Facility: CLINIC | Age: 56
End: 2026-06-01
Payer: MEDICAID

## (undated) DEVICE — BASKET, STONE 1.9 X 120 SKYLITE TIPLESS 12MM BASKET

## (undated) DEVICE — Device

## (undated) DEVICE — CATHETER, URETERAL, OPEN END, 5 FR, 70 CM

## (undated) DEVICE — GLOVE, SURGICAL, PROTEXIS,  7.0, PF, LATEX

## (undated) DEVICE — GLOVE, SURGICAL, PROTEXIS PI MICRO, 7.0, PF, LF

## (undated) DEVICE — HOLMIUM LASER LOW WATTAGE

## (undated) DEVICE — SOLUTION, IRRIGATION, USP, SODIUM CHLORIDE 0.9%, 3000 ML, BAG

## (undated) DEVICE — IRRIGATION KIT, PUMPING, SINGLE ACTION, SYRINGE, 10 CC

## (undated) DEVICE — SYSTEM, EASY CATCHER, DISP, NON-STERILE

## (undated) DEVICE — CATHETER, URETERAL, POLLACK, OPEN END, 5.5 FR, 70 CM

## (undated) DEVICE — HOLMIUM 365 FORTEC FIBER

## (undated) DEVICE — GUIDEWIRE, SOLO FLEX HYBRID, .035 STRAIGHT TIP

## (undated) DEVICE — BIOPSY PORT SEALS, SINGLE USE, 3 FR